# Patient Record
Sex: FEMALE | Race: BLACK OR AFRICAN AMERICAN | Employment: OTHER
[De-identification: names, ages, dates, MRNs, and addresses within clinical notes are randomized per-mention and may not be internally consistent; named-entity substitution may affect disease eponyms.]

---

## 2023-07-22 ENCOUNTER — APPOINTMENT (OUTPATIENT)
Facility: HOSPITAL | Age: 48
End: 2023-07-22
Payer: MEDICAID

## 2023-07-22 ENCOUNTER — HOSPITAL ENCOUNTER (EMERGENCY)
Facility: HOSPITAL | Age: 48
Discharge: HOME | End: 2023-07-22
Attending: EMERGENCY MEDICINE
Payer: MEDICAID

## 2023-07-22 VITALS
RESPIRATION RATE: 18 BRPM | SYSTOLIC BLOOD PRESSURE: 140 MMHG | OXYGEN SATURATION: 100 % | WEIGHT: 219 LBS | BODY MASS INDEX: 44.15 KG/M2 | TEMPERATURE: 98.1 F | DIASTOLIC BLOOD PRESSURE: 92 MMHG | HEART RATE: 98 BPM | HEIGHT: 59 IN

## 2023-07-22 DIAGNOSIS — M54.50 RIGHT-SIDED LOW BACK PAIN WITHOUT SCIATICA, UNSPECIFIED CHRONICITY: Primary | ICD-10-CM
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
## 2023-07-22 DIAGNOSIS — M51.36 DEGENERATIVE DISC DISEASE, LUMBAR: ICD-10-CM

## 2023-07-22 LAB — HCG UR QL: NEGATIVE

## 2023-07-22 PROCEDURE — 72100 X-RAY EXAM L-S SPINE 2/3 VWS: CPT

## 2023-07-22 PROCEDURE — 99283 EMERGENCY DEPT VISIT LOW MDM: CPT

## 2023-07-22 PROCEDURE — 81025 URINE PREGNANCY TEST: CPT

## 2023-07-22 RX ORDER — ONDANSETRON 4 MG/1
4 TABLET, ORALLY DISINTEGRATING ORAL EVERY 8 HOURS PRN
Status: DISCONTINUED | OUTPATIENT
Start: 2023-07-22 | End: 2023-07-22 | Stop reason: HOSPADM

## 2023-07-22 RX ORDER — LIDOCAINE 50 MG/G
1 PATCH TOPICAL DAILY
Qty: 10 PATCH | Refills: 0 | Status: SHIPPED | OUTPATIENT
Start: 2023-07-22 | End: 2023-08-01

## 2023-07-22 RX ORDER — HYDROCODONE BITARTRATE AND ACETAMINOPHEN 7.5; 325 MG/1; MG/1
1 TABLET ORAL
Status: DISCONTINUED | OUTPATIENT
Start: 2023-07-22 | End: 2023-07-22

## 2023-07-22 RX ORDER — METHOCARBAMOL 750 MG/1
750 TABLET, FILM COATED ORAL 3 TIMES DAILY
Qty: 20 TABLET | Refills: 0 | Status: SHIPPED | OUTPATIENT
Start: 2023-07-22 | End: 2023-07-29

## 2023-07-22 RX ORDER — ACETAMINOPHEN 325 MG/1
650 TABLET ORAL EVERY 4 HOURS PRN
Status: DISCONTINUED | OUTPATIENT
Start: 2023-07-22 | End: 2023-07-22 | Stop reason: HOSPADM

## 2023-07-22 RX ORDER — METHOCARBAMOL 750 MG/1
750 TABLET, FILM COATED ORAL
Status: DISCONTINUED | OUTPATIENT
Start: 2023-07-22 | End: 2023-07-22 | Stop reason: HOSPADM

## 2023-07-22 ASSESSMENT — PAIN DESCRIPTION - DESCRIPTORS: DESCRIPTORS: ACHING

## 2023-07-22 ASSESSMENT — ENCOUNTER SYMPTOMS
BACK PAIN: 1
VOICE CHANGE: 0
DIARRHEA: 0
ABDOMINAL PAIN: 0
TROUBLE SWALLOWING: 0
NAUSEA: 0
SHORTNESS OF BREATH: 0
COUGH: 0

## 2023-07-22 ASSESSMENT — PAIN DESCRIPTION - LOCATION: LOCATION: BACK

## 2023-07-22 ASSESSMENT — PAIN - FUNCTIONAL ASSESSMENT: PAIN_FUNCTIONAL_ASSESSMENT: 0-10

## 2023-07-22 ASSESSMENT — PAIN SCALES - GENERAL: PAINLEVEL_OUTOF10: 9

## 2023-07-22 NOTE — ED TRIAGE NOTES
Pt c/o bilateral lower back following fall x 4 days ago. States she has been taking flexeril and heat/cold without relief.

## 2023-07-22 NOTE — ED NOTES
Pt made aware need for specimen sample. Pt voices inability to urinate, at this time. NP made aware pt pt requesting Tylenol versus Norco. Order placed. 1145: Pt to imaging. Pt returned from imaging,refused medication. Voice frustration of \"cold Xray table\"    5337: Pt moved to tafoya bed, visitor at bedside.       Shy Roger LPN  52/96/80 3288

## 2023-07-22 NOTE — ED PROVIDER NOTES
drugs.  Prescription drug management. REASSESSMENT    Patient has been reexamined and denies any further complaints of pain or discomfort. Will prescribe short course of Lidoderm topical patches with Robaxin for pain control. Recommend close follow-up with orthopedic back specialist for further evaluation and possible physical therapy. 5:40 PM  Patient's results and plan of care have been reviewed with the patient and her daughter. Patient and/or family have verbally conveyed their understanding and agreement of the patient's signs, symptoms, diagnosis, treatment and prognosis and additionally agree to follow up as recommended or return to the Emergency Room should her condition change prior to follow-up. Discharge instructions have also been provided to the patient with some educational information regarding her diagnosis as well a list of reasons why she would want to return to the ER prior to her follow-up appointment should her condition change. CONSULTS:  None    PROCEDURES:  Unless otherwise noted below, none     Procedures      FINAL IMPRESSION      1. Right-sided low back pain without sciatica, unspecified chronicity    2. Degenerative disc disease, lumbar    3.  Fall, initial encounter          DISPOSITION/PLAN   DISPOSITION Decision To Discharge 07/22/2023 01:04:23 PM      PATIENT REFERRED TO:  Sylvia Dumont MD  [facility]  [address]  [address]  [phone]    In 3 days  As needed, If symptoms worsen      DISCHARGE MEDICATIONS:  Discharge Medication List as of 7/22/2023  1:12 PM        START taking these medications    Details   methocarbamol (ROBAXIN-750) 750 MG tablet Take 1 tablet by mouth 3 times daily for 7 days, Disp-20 tablet, R-0Normal      lidocaine (LIDODERM) 5 % Place 1 patch onto the skin daily for 10 days 12 hours on, 12 hours off., Disp-10 patch, R-0Normal               (Please note that portions of this note were completed with a voice recognition program.  Efforts were made to edit the dictations but occasionally words are mis-transcribed.)    ROB Lundberg NP (electronically signed)  Emergency Attending Physician / Physician Assistant / Nurse Practitioner             ROB Lundberg NP  07/22/23 0538

## 2023-07-24 ENCOUNTER — TELEPHONE (OUTPATIENT)
Age: 48
End: 2023-07-24

## 2023-07-24 NOTE — TELEPHONE ENCOUNTER
----- Message from [facility] sent at 6/7/2023  2:32 PM EDT -----  Subject: Appointment Request    Reason for Call: New Patient/New to Provider Appointment needed: New   Patient Request Appointment    QUESTIONS    Reason for appointment request? No appointments available during search     Additional Information for Provider? Patient called in to reschedule her   appointment that she had for today at 3:00pm 6/7. No appointments were   available.  Please contact patient to further assist.   ---------------------------------------------------------------------------  --------------  Ronal Christian YANDEL  [phone]; OK to leave message on voicemail  ---------------------------------------------------------------------------  --------------  SCRIPT ANSWERS  COVID Screen: Minta Bumpers

## 2023-07-27 ENCOUNTER — OFFICE VISIT (OUTPATIENT)
Age: 48
End: 2023-07-27

## 2023-07-27 DIAGNOSIS — E66.01 MORBID OBESITY (HCC): Primary | ICD-10-CM

## 2023-08-03 ENCOUNTER — HOSPITAL ENCOUNTER (EMERGENCY)
Facility: HOSPITAL | Age: 48
Discharge: HOME | End: 2023-08-03
Attending: EMERGENCY MEDICINE
Payer: MEDICAID

## 2023-08-03 VITALS
RESPIRATION RATE: 18 BRPM | TEMPERATURE: 98.4 F | WEIGHT: 215 LBS | SYSTOLIC BLOOD PRESSURE: 165 MMHG | HEIGHT: 59 IN | DIASTOLIC BLOOD PRESSURE: 65 MMHG | OXYGEN SATURATION: 100 % | HEART RATE: 90 BPM | BODY MASS INDEX: 43.34 KG/M2

## 2023-08-03 DIAGNOSIS — H10.9 CONJUNCTIVITIS OF RIGHT EYE, UNSPECIFIED CONJUNCTIVITIS TYPE: Primary | ICD-10-CM

## 2023-08-03 PROCEDURE — 99283 EMERGENCY DEPT VISIT LOW MDM: CPT

## 2023-08-03 RX ORDER — POLYMYXIN B SULFATE AND TRIMETHOPRIM 1; 10000 MG/ML; [USP'U]/ML
1 SOLUTION OPHTHALMIC EVERY 6 HOURS
Qty: 10 ML | Refills: 0 | Status: SHIPPED | OUTPATIENT
Start: 2023-08-03 | End: 2023-08-13

## 2023-08-03 ASSESSMENT — PAIN DESCRIPTION - ORIENTATION: ORIENTATION: RIGHT

## 2023-08-03 ASSESSMENT — PAIN SCALES - GENERAL: PAINLEVEL_OUTOF10: 8

## 2023-08-03 ASSESSMENT — PAIN - FUNCTIONAL ASSESSMENT: PAIN_FUNCTIONAL_ASSESSMENT: 0-10

## 2023-08-03 ASSESSMENT — PAIN DESCRIPTION - LOCATION: LOCATION: EYE

## 2023-08-03 NOTE — ED PROVIDER NOTES
[facility] EMERGENCY DEPT  EMERGENCY DEPARTMENT ENCOUNTER      Pt Name: Jagdish Lee  MRN: [id number]  [address] [facility] [place] 1975  Date of evaluation: 8/3/2023  Provider: Elizabeth Mondragon MD    [address]       Chief Complaint   Patient presents with    Eye Pain         HISTORY OF PRESENT ILLNESS   (Location/Symptom, Timing/Onset, Context/Setting, Quality, Duration, Modifying Factors, Severity)  Note limiting factors. 48-year-old who is status post bilateral corneal transplants 9 years ago. She presents with complaints that she awoke this morning with her right eyelashes stuck together. She noted some discharge. Her right eye feels irritated. She states that yesterday her eye doctor removed 2 sutures from her right eye that had been in place since her transplant surgery. No change in vision. No eye redness noted. No other complaints. Review of External Medical Records:     Nursing Notes were reviewed. REVIEW OF SYSTEMS    (2-9 systems for level 4, 10 or more for level 5)     Review of Systems    Except as noted above the remainder of the review of systems was reviewed and negative. PAST MEDICAL HISTORY     Past Medical History:   Diagnosis Date    Hx of cornea transplant     Left Eye         SURGICAL HISTORY       Past Surgical History:   Procedure Laterality Date    BREAST ENHANCEMENT SURGERY       SECTION      GASTRIC BYPASS SURGERY N/A     By Dr. Sofie Falcon       Previous Medications    ACYCLOVIR (ZOVIRAX) 400 MG TABLET    Take 1 tablet by mouth    CYCLOBENZAPRINE (FLEXERIL) 10 MG TABLET    TAKE 1 TABLET BY MOUTH EVERY DAY AT BEDTIME AS NEEDED FOR 90 DAYS    ERGOCALCIFEROL (ERGOCALCIFEROL) 1.25 MG (67891 UT) CAPSULE    Take 50,000 Units by mouth    HYDROCODONE-CHLORPHENIRAMINE (TUSSIONEX) 10-8 MG/5ML SUER    Take 5 mLs by mouth. ALLERGIES     Aspirin and Ibuprofen    FAMILY HISTORY     History reviewed.  No pertinent

## 2023-08-03 NOTE — ED TRIAGE NOTES
Pt presents to ER with c/o right eye pain s/p having her sutures removed yesterday in her right eye by Dr. Aria Amaro at [facility]. Pt reports h/o b/l corneal transplant in 2014 and she's had her right eye sutures in place since then. Sutures removed yesterday with c/o pain, irritation, discharge, and burning since this AM when she woke up. Denies fevers, denies blurred vision, no recent illness.

## 2023-08-03 NOTE — ED NOTES
Discharge instructions and paperwork was given to this patient with no further questions asked. Patient verbalized an understanding.  Patient left the ED in stable condition     1404 Dane Powell RN  08/03/23 5787

## 2023-08-10 ENCOUNTER — TELEPHONE (OUTPATIENT)
Age: 48
End: 2023-08-10

## 2023-08-10 NOTE — TELEPHONE ENCOUNTER
Patient left a voicemail requesting an appointment with Dr. Tanya Sevilla. Patient stated she has completed the UGI and is now ready to schedule. Please call patient.

## 2023-08-17 ENCOUNTER — TELEPHONE (OUTPATIENT)
Age: 48
End: 2023-08-17

## 2023-08-17 NOTE — TELEPHONE ENCOUNTER
SPOKE WITH PT ADVISED HER THAT SHE STILL NEEDED HER EGD BEFORE WE COULD SCHEDULE WITH DR. WALL -- PT STATED THAT SHE HAD IT DONE TWO WEEKS AGO -- I ADVISED THE PT THAT I WOULD REACH OUT TO DR. YEH'S OFFICE TO CONFIRM.

## 2023-08-21 ENCOUNTER — OFFICE VISIT (OUTPATIENT)
Age: 48
End: 2023-08-21

## 2023-08-21 DIAGNOSIS — E66.01 MORBID OBESITY (HCC): Primary | ICD-10-CM

## 2023-08-30 ENCOUNTER — OFFICE VISIT (OUTPATIENT)
Age: 48
End: 2023-08-30
Payer: MEDICAID

## 2023-08-30 VITALS
OXYGEN SATURATION: 97 % | BODY MASS INDEX: 45.16 KG/M2 | SYSTOLIC BLOOD PRESSURE: 130 MMHG | HEART RATE: 91 BPM | RESPIRATION RATE: 16 BRPM | WEIGHT: 224 LBS | TEMPERATURE: 98.7 F | DIASTOLIC BLOOD PRESSURE: 80 MMHG | HEIGHT: 59 IN

## 2023-08-30 DIAGNOSIS — R06.83 SNORING: ICD-10-CM

## 2023-08-30 DIAGNOSIS — E66.01 MORBID OBESITY (HCC): Primary | ICD-10-CM

## 2023-08-30 PROCEDURE — 99213 OFFICE O/P EST LOW 20 MIN: CPT | Performed by: SURGERY

## 2023-08-30 RX ORDER — ERGOCALCIFEROL 1.25 MG/1
50000 CAPSULE ORAL
Qty: 24 CAPSULE | Refills: 0 | Status: SHIPPED | OUTPATIENT
Start: 2023-08-31

## 2023-08-30 ASSESSMENT — PATIENT HEALTH QUESTIONNAIRE - PHQ9
SUM OF ALL RESPONSES TO PHQ QUESTIONS 1-9: 0
2. FEELING DOWN, DEPRESSED OR HOPELESS: 0
1. LITTLE INTEREST OR PLEASURE IN DOING THINGS: 0
SUM OF ALL RESPONSES TO PHQ9 QUESTIONS 1 & 2: 0

## 2023-09-01 ENCOUNTER — CLINICAL DOCUMENTATION (OUTPATIENT)
Age: 48
End: 2023-09-01

## 2023-09-11 ENCOUNTER — TELEPHONE (OUTPATIENT)
Age: 48
End: 2023-09-11

## 2023-09-11 NOTE — TELEPHONE ENCOUNTER
This writer attempted to call and remind pt of her appt on 9/12. All numbers in file for pt were out of service.

## 2023-09-12 ENCOUNTER — OFFICE VISIT (OUTPATIENT)
Age: 48
End: 2023-09-12
Payer: MEDICAID

## 2023-09-12 ENCOUNTER — TELEPHONE (OUTPATIENT)
Age: 48
End: 2023-09-12

## 2023-09-12 VITALS
RESPIRATION RATE: 18 BRPM | WEIGHT: 226.8 LBS | SYSTOLIC BLOOD PRESSURE: 142 MMHG | HEART RATE: 84 BPM | DIASTOLIC BLOOD PRESSURE: 86 MMHG | TEMPERATURE: 97.8 F | HEIGHT: 59 IN | BODY MASS INDEX: 45.72 KG/M2

## 2023-09-12 DIAGNOSIS — Z12.11 COLON CANCER SCREENING: ICD-10-CM

## 2023-09-12 DIAGNOSIS — N89.8 VAGINAL ITCHING: ICD-10-CM

## 2023-09-12 DIAGNOSIS — Z98.84 STATUS POST GASTRIC BYPASS FOR OBESITY: Primary | ICD-10-CM

## 2023-09-12 DIAGNOSIS — Z12.31 ENCOUNTER FOR SCREENING MAMMOGRAM FOR MALIGNANT NEOPLASM OF BREAST: ICD-10-CM

## 2023-09-12 DIAGNOSIS — E66.01 MORBID OBESITY (HCC): ICD-10-CM

## 2023-09-12 PROCEDURE — 99204 OFFICE O/P NEW MOD 45 MIN: CPT

## 2023-09-12 RX ORDER — PHENTERMINE HYDROCHLORIDE 15 MG/1
15 CAPSULE ORAL EVERY MORNING
Qty: 30 CAPSULE | Refills: 0 | Status: SHIPPED | OUTPATIENT
Start: 2023-09-12 | End: 2023-10-12

## 2023-09-12 SDOH — ECONOMIC STABILITY: HOUSING INSECURITY
IN THE LAST 12 MONTHS, WAS THERE A TIME WHEN YOU DID NOT HAVE A STEADY PLACE TO SLEEP OR SLEPT IN A SHELTER (INCLUDING NOW)?: NO

## 2023-09-12 SDOH — ECONOMIC STABILITY: FOOD INSECURITY: WITHIN THE PAST 12 MONTHS, THE FOOD YOU BOUGHT JUST DIDN'T LAST AND YOU DIDN'T HAVE MONEY TO GET MORE.: NEVER TRUE

## 2023-09-12 SDOH — ECONOMIC STABILITY: FOOD INSECURITY: WITHIN THE PAST 12 MONTHS, YOU WORRIED THAT YOUR FOOD WOULD RUN OUT BEFORE YOU GOT MONEY TO BUY MORE.: NEVER TRUE

## 2023-09-12 SDOH — ECONOMIC STABILITY: INCOME INSECURITY: HOW HARD IS IT FOR YOU TO PAY FOR THE VERY BASICS LIKE FOOD, HOUSING, MEDICAL CARE, AND HEATING?: NOT HARD AT ALL

## 2023-09-12 ASSESSMENT — PATIENT HEALTH QUESTIONNAIRE - PHQ9
SUM OF ALL RESPONSES TO PHQ9 QUESTIONS 1 & 2: 0
1. LITTLE INTEREST OR PLEASURE IN DOING THINGS: 0
SUM OF ALL RESPONSES TO PHQ QUESTIONS 1-9: 0
2. FEELING DOWN, DEPRESSED OR HOPELESS: 0

## 2023-09-12 NOTE — TELEPHONE ENCOUNTER
Patient called to update phone number to [phone]. Updated phone number.  Advised patient to call [phone] or [phone] to schedule sleep study

## 2023-09-12 NOTE — PROGRESS NOTES
Subjective     Chief Complaint   Patient presents with    New Patient      Nestor Reeves is a 51 y.o. female who presents for establishing care. Was seeing prior PCP but is establishing d/t insurance. History of LRYGBP (Dr. Eulene Schwab in 2005) with satisfactory weight loss (brittany 170 lbs) but subsequent weight regain in the following years. Checked in with bariatric surgery on 8/21/2023 seeking revisional bariatric surgery. Has been seeing nutritionist through bariatric team (Dr. Trev Dominguez) and has been working on nutrition and weight loss. Lost 8 pounds with one month of phentermine. Reports she was on highest dose. Tolerated well with no adverse symptoms    Dr. Erlinda Norwood. Optho. Corneal Transplants. L eye better than R.    Just got lab work with Dr. Trev Dominguez. Low vitamin D. Taking vitamin D supplements. Has gotten bariatric swallow and brandon en y has stretched out    Has two kids. Denies history of diabetes or HTN    MACK? Sleep study in the process of being scheduled. Has history of sleep apnea. Used CPAP, after bariatric surgery lost weight and didn't. Pap? Years since pap smear. Mammogram, Colonoscopy? Needs both    Past Medical History  Past Medical History:   Diagnosis Date    Asthma 02/2014    Hx of cornea transplant     Left Eye    Osteoarthritis 07/04/2023     Current Medications  Current Outpatient Medications   Medication Sig Dispense Refill    phentermine 15 MG capsule Take 1 capsule by mouth every morning for 30 days.  Max Daily Amount: 15 mg 30 capsule 0    Multiple Vitamins-Minerals (BARIATRIC MULTIVITAMINS/IRON PO) Take by mouth      vitamin D (ERGOCALCIFEROL) 1.25 MG (02167 UT) CAPS capsule Take 1 capsule by mouth Twice a Week 24 capsule 0    cyclobenzaprine (FLEXERIL) 10 MG tablet       acyclovir (ZOVIRAX) 400 MG tablet Take 1 tablet by mouth      Cyanocobalamin (VITAMIN B-12) 500 MCG LOZG Take by mouth (Patient not taking: Reported on 9/12/2023)      Calcium Citrate-Vitamin D (CALCIUM

## 2023-09-12 NOTE — PATIENT INSTRUCTIONS
Please use monistat over the counter as the box instructs for your vaginal itching. You can get this over the counter.

## 2023-09-18 ENCOUNTER — OFFICE VISIT (OUTPATIENT)
Age: 48
End: 2023-09-18

## 2023-09-18 DIAGNOSIS — E66.01 MORBID OBESITY (HCC): Primary | ICD-10-CM

## 2023-09-18 NOTE — PROGRESS NOTES
eating refined carbohydrate foods (bread, pasta, rice, potatoes) rarely. Pt is eating sweets/desserts occasionally. Pt is using healthy cooking methods. Pt is eating meals prepared outside of the home occasionally. Pt is drinking water/crystal lite-40-50 oz. Pt reports no emotional eating. Physical Activity/Exercise  We talked about the importance of increasing daily physical activity and beginning to develop an exercise regimen/routine. We talked about exercise as being an important part of long term weight loss after surgery. Comments:  During class, I discussed with patient the importance of getting into an exercise routine. Pt doing water aerobics 3x week for 1 1/2 hours or walking or going to gym doing Tabata classes Pt is encouraged to continue with current exercise    Behavior Modification       We talked about how to eat more mindfully. Tips and recommendations for how to make these changes were provided. Pt was encouraged to keep a food journal and record what they were taking in daily. Discussed drinking rule    Overall Assessment:  Nutrition evaluation reveals small lifestyle changes are being made. Goals set and recommendations made. Will continue to assess. Patient-Set Goals:   1. Nutrition - increase fluids to 64 oz per day  2. Exercise - continue with current exercise  3.  Behavior - no ice during drinking rule; take calcium citrate consistently; purchase bariatric MVI w/victorino Mercedes, RD  9/18/2023

## 2023-09-29 ENCOUNTER — OFFICE VISIT (OUTPATIENT)
Age: 48
End: 2023-09-29
Payer: MEDICAID

## 2023-09-29 VITALS
TEMPERATURE: 97.3 F | HEIGHT: 59 IN | SYSTOLIC BLOOD PRESSURE: 137 MMHG | BODY MASS INDEX: 44.58 KG/M2 | OXYGEN SATURATION: 96 % | RESPIRATION RATE: 18 BRPM | DIASTOLIC BLOOD PRESSURE: 82 MMHG | WEIGHT: 221.13 LBS | HEART RATE: 88 BPM

## 2023-09-29 DIAGNOSIS — R63.4 WEIGHT LOSS: ICD-10-CM

## 2023-09-29 DIAGNOSIS — Z98.84 STATUS POST GASTRIC BYPASS FOR OBESITY: ICD-10-CM

## 2023-09-29 DIAGNOSIS — B00.9 HERPES: ICD-10-CM

## 2023-09-29 DIAGNOSIS — M62.838 NECK MUSCLE SPASM: ICD-10-CM

## 2023-09-29 DIAGNOSIS — E66.01 MORBID OBESITY (HCC): Primary | ICD-10-CM

## 2023-09-29 PROCEDURE — 99214 OFFICE O/P EST MOD 30 MIN: CPT

## 2023-09-29 RX ORDER — PHENTERMINE HYDROCHLORIDE 30 MG/1
30 CAPSULE ORAL EVERY MORNING
Qty: 30 CAPSULE | Refills: 0 | Status: CANCELLED | OUTPATIENT
Start: 2023-09-29 | End: 2023-10-29

## 2023-09-29 RX ORDER — CYCLOBENZAPRINE HCL 10 MG
10 TABLET ORAL 3 TIMES DAILY PRN
Qty: 21 TABLET | Refills: 0 | Status: SHIPPED | OUTPATIENT
Start: 2023-09-29 | End: 2023-10-09

## 2023-09-29 RX ORDER — ACYCLOVIR 400 MG/1
800 TABLET ORAL 2 TIMES DAILY
Qty: 30 TABLET | Refills: 0 | Status: SHIPPED | OUTPATIENT
Start: 2023-09-29 | End: 2023-10-07

## 2023-09-29 RX ORDER — PHENTERMINE HYDROCHLORIDE 15 MG/1
15 CAPSULE ORAL EVERY MORNING
Qty: 30 CAPSULE | Refills: 0 | Status: SHIPPED | OUTPATIENT
Start: 2023-09-29 | End: 2023-10-29

## 2023-09-29 SDOH — ECONOMIC STABILITY: FOOD INSECURITY: WITHIN THE PAST 12 MONTHS, YOU WORRIED THAT YOUR FOOD WOULD RUN OUT BEFORE YOU GOT MONEY TO BUY MORE.: PATIENT DECLINED

## 2023-09-29 SDOH — ECONOMIC STABILITY: HOUSING INSECURITY
IN THE LAST 12 MONTHS, WAS THERE A TIME WHEN YOU DID NOT HAVE A STEADY PLACE TO SLEEP OR SLEPT IN A SHELTER (INCLUDING NOW)?: PATIENT REFUSED

## 2023-09-29 SDOH — ECONOMIC STABILITY: INCOME INSECURITY: HOW HARD IS IT FOR YOU TO PAY FOR THE VERY BASICS LIKE FOOD, HOUSING, MEDICAL CARE, AND HEATING?: PATIENT DECLINED

## 2023-09-29 SDOH — ECONOMIC STABILITY: FOOD INSECURITY: WITHIN THE PAST 12 MONTHS, THE FOOD YOU BOUGHT JUST DIDN'T LAST AND YOU DIDN'T HAVE MONEY TO GET MORE.: PATIENT DECLINED

## 2023-09-29 ASSESSMENT — PATIENT HEALTH QUESTIONNAIRE - PHQ9
1. LITTLE INTEREST OR PLEASURE IN DOING THINGS: 0
SUM OF ALL RESPONSES TO PHQ9 QUESTIONS 1 & 2: 0
SUM OF ALL RESPONSES TO PHQ QUESTIONS 1-9: 0
2. FEELING DOWN, DEPRESSED OR HOPELESS: 0
SUM OF ALL RESPONSES TO PHQ QUESTIONS 1-9: 0

## 2023-09-29 NOTE — PROGRESS NOTES
[facility] [place], [address] [facility] [address] [facility]   Office [phone], Fax [phone]   Subjective     Chief Complaint   Patient presents with    Weight Loss      Felipe Vieira is a 51 y.o. female who presents for weight loss follow up. Re-started on phentermine one month ago at 15mg daily dose. Has lost 5 pounds since then. Doing well with no adverse symptoms    Goal is to be below 200 pounds. 170 is another goal. 35 pounds total to lose, has lost 5 pounds. BP: 137/82. Taking BP at home: Takes every week, Mostly high 321'Y systolic. Lifestyle:  - Exercise: Tabata classes twice per week. Going to gym 3 times week. - Nutrition: Has appt Oct 16th. Trying to get her in for a sleep study. Working on getting good protein in. Reports she needs refill on acyclovir. Last flair up 3-6 months ago. Has been taking daily. No palpitations, headaches, vision changes. Past Medical History  Past Medical History:   Diagnosis Date    Asthma 02/2014    Hx of cornea transplant     Left Eye    Osteoarthritis 07/04/2023     Current Medications  Current Outpatient Medications   Medication Sig Dispense Refill    acyclovir (ZOVIRAX) 400 MG tablet Take 2 tablets by mouth in the morning and at bedtime for 8 days Take 800 mg (two pills) twice per day for 5 days with herpes flair up 30 tablet 0    cyclobenzaprine (FLEXERIL) 10 MG tablet Take 1 tablet by mouth 3 times daily as needed for Muscle spasms 21 tablet 0    phentermine 15 MG capsule Take 1 capsule by mouth every morning for 30 days.  Max Daily Amount: 15 mg 30 capsule 0    Cyanocobalamin (VITAMIN B-12) 500 MCG LOZG Take by mouth      Multiple Vitamins-Minerals (BARIATRIC MULTIVITAMINS/IRON PO) Take by mouth      vitamin D (ERGOCALCIFEROL) 1.25 MG (41866 UT) CAPS capsule Take 1 capsule by mouth Twice a Week 24 capsule 0    Calcium Citrate-Vitamin D (CALCIUM CITRATE + D PO) Take by mouth (Patient not taking: Reported on 9/12/2023)       No current

## 2023-09-30 LAB
CHOLEST SERPL-MCNC: 171 MG/DL (ref 100–199)
HBA1C MFR BLD: 6 % (ref 4.8–5.6)
HDLC SERPL-MCNC: 76 MG/DL
IMP & REVIEW OF LAB RESULTS: NORMAL
LDLC SERPL CALC-MCNC: 86 MG/DL (ref 0–99)
TRIGL SERPL-MCNC: 41 MG/DL (ref 0–149)
TSH SERPL DL<=0.005 MIU/L-ACNC: 0.61 UIU/ML (ref 0.45–4.5)
VLDLC SERPL CALC-MCNC: 9 MG/DL (ref 5–40)

## 2023-10-02 NOTE — PROGRESS NOTES
I reviewed with the resident the medical history and the resident's findings on the physical examination. I discussed with the resident the patient's diagnosis and concur with the plan.      Elroy Pereira MD 10/2/2023

## 2023-10-30 ENCOUNTER — OFFICE VISIT (OUTPATIENT)
Age: 48
End: 2023-10-30
Payer: MEDICAID

## 2023-10-30 VITALS
SYSTOLIC BLOOD PRESSURE: 133 MMHG | WEIGHT: 217.4 LBS | OXYGEN SATURATION: 96 % | HEART RATE: 100 BPM | DIASTOLIC BLOOD PRESSURE: 89 MMHG | RESPIRATION RATE: 18 BRPM | HEIGHT: 59 IN | TEMPERATURE: 97.5 F | BODY MASS INDEX: 43.83 KG/M2

## 2023-10-30 DIAGNOSIS — E66.01 MORBID OBESITY (HCC): Primary | ICD-10-CM

## 2023-10-30 DIAGNOSIS — R63.4 WEIGHT LOSS: ICD-10-CM

## 2023-10-30 PROCEDURE — 99213 OFFICE O/P EST LOW 20 MIN: CPT

## 2023-10-30 RX ORDER — PHENTERMINE HYDROCHLORIDE 15 MG/1
15 CAPSULE ORAL EVERY MORNING
COMMUNITY
End: 2023-10-30

## 2023-10-30 RX ORDER — PHENTERMINE HYDROCHLORIDE 30 MG/1
30 CAPSULE ORAL EVERY MORNING
Qty: 30 CAPSULE | Refills: 0 | Status: SHIPPED | OUTPATIENT
Start: 2023-10-30 | End: 2023-11-29

## 2023-10-30 SDOH — ECONOMIC STABILITY: FOOD INSECURITY: WITHIN THE PAST 12 MONTHS, YOU WORRIED THAT YOUR FOOD WOULD RUN OUT BEFORE YOU GOT MONEY TO BUY MORE.: PATIENT DECLINED

## 2023-10-30 SDOH — ECONOMIC STABILITY: FOOD INSECURITY: WITHIN THE PAST 12 MONTHS, THE FOOD YOU BOUGHT JUST DIDN'T LAST AND YOU DIDN'T HAVE MONEY TO GET MORE.: PATIENT DECLINED

## 2023-10-30 SDOH — ECONOMIC STABILITY: INCOME INSECURITY: HOW HARD IS IT FOR YOU TO PAY FOR THE VERY BASICS LIKE FOOD, HOUSING, MEDICAL CARE, AND HEATING?: PATIENT DECLINED

## 2023-10-30 ASSESSMENT — PATIENT HEALTH QUESTIONNAIRE - PHQ9
SUM OF ALL RESPONSES TO PHQ QUESTIONS 1-9: 0
2. FEELING DOWN, DEPRESSED OR HOPELESS: 0
SUM OF ALL RESPONSES TO PHQ QUESTIONS 1-9: 0
1. LITTLE INTEREST OR PLEASURE IN DOING THINGS: 0
SUM OF ALL RESPONSES TO PHQ9 QUESTIONS 1 & 2: 0

## 2023-10-31 ENCOUNTER — OFFICE VISIT (OUTPATIENT)
Age: 48
End: 2023-10-31

## 2023-10-31 DIAGNOSIS — E66.01 MORBID OBESITY (HCC): Primary | ICD-10-CM

## 2023-11-20 ENCOUNTER — OFFICE VISIT (OUTPATIENT)
Age: 48
End: 2023-11-20
Payer: MEDICAID

## 2023-11-20 VITALS
SYSTOLIC BLOOD PRESSURE: 116 MMHG | HEIGHT: 59 IN | DIASTOLIC BLOOD PRESSURE: 82 MMHG | BODY MASS INDEX: 45.08 KG/M2 | TEMPERATURE: 97.3 F | HEART RATE: 93 BPM | OXYGEN SATURATION: 99 % | RESPIRATION RATE: 18 BRPM | WEIGHT: 223.6 LBS

## 2023-11-20 DIAGNOSIS — E66.01 MORBID OBESITY (HCC): Primary | ICD-10-CM

## 2023-11-20 DIAGNOSIS — R63.4 WEIGHT LOSS: ICD-10-CM

## 2023-11-20 PROCEDURE — 99213 OFFICE O/P EST LOW 20 MIN: CPT

## 2023-11-20 RX ORDER — CYCLOBENZAPRINE HCL 10 MG
TABLET ORAL
COMMUNITY
Start: 2023-11-12

## 2023-11-20 RX ORDER — PHENTERMINE HYDROCHLORIDE 30 MG/1
30 CAPSULE ORAL EVERY MORNING
Qty: 30 CAPSULE | Refills: 0 | Status: SHIPPED | OUTPATIENT
Start: 2023-11-20 | End: 2023-12-20

## 2023-11-20 SDOH — ECONOMIC STABILITY: FOOD INSECURITY: WITHIN THE PAST 12 MONTHS, YOU WORRIED THAT YOUR FOOD WOULD RUN OUT BEFORE YOU GOT MONEY TO BUY MORE.: PATIENT DECLINED

## 2023-11-20 SDOH — ECONOMIC STABILITY: FOOD INSECURITY: WITHIN THE PAST 12 MONTHS, THE FOOD YOU BOUGHT JUST DIDN'T LAST AND YOU DIDN'T HAVE MONEY TO GET MORE.: PATIENT DECLINED

## 2023-11-20 SDOH — ECONOMIC STABILITY: INCOME INSECURITY: HOW HARD IS IT FOR YOU TO PAY FOR THE VERY BASICS LIKE FOOD, HOUSING, MEDICAL CARE, AND HEATING?: PATIENT DECLINED

## 2023-11-20 ASSESSMENT — PATIENT HEALTH QUESTIONNAIRE - PHQ9
SUM OF ALL RESPONSES TO PHQ QUESTIONS 1-9: 0
1. LITTLE INTEREST OR PLEASURE IN DOING THINGS: 0
SUM OF ALL RESPONSES TO PHQ9 QUESTIONS 1 & 2: 0
2. FEELING DOWN, DEPRESSED OR HOPELESS: 0

## 2023-11-29 ENCOUNTER — HOSPITAL ENCOUNTER (EMERGENCY)
Facility: HOSPITAL | Age: 48
Discharge: HOME | End: 2023-11-29
Attending: EMERGENCY MEDICINE
Payer: MEDICAID

## 2023-11-29 ENCOUNTER — APPOINTMENT (OUTPATIENT)
Facility: HOSPITAL | Age: 48
End: 2023-11-29
Payer: MEDICAID

## 2023-11-29 VITALS
SYSTOLIC BLOOD PRESSURE: 145 MMHG | BODY MASS INDEX: 43.75 KG/M2 | HEART RATE: 92 BPM | OXYGEN SATURATION: 100 % | WEIGHT: 217 LBS | RESPIRATION RATE: 16 BRPM | HEIGHT: 59 IN | TEMPERATURE: 99.2 F | DIASTOLIC BLOOD PRESSURE: 64 MMHG

## 2023-11-29 DIAGNOSIS — R07.89 CHEST WALL PAIN: ICD-10-CM
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
## 2023-11-29 DIAGNOSIS — M54.50 ACUTE BILATERAL LOW BACK PAIN, UNSPECIFIED WHETHER SCIATICA PRESENT: ICD-10-CM

## 2023-11-29 PROCEDURE — 6360000002 HC RX W HCPCS: Performed by: FAMILY MEDICINE

## 2023-11-29 PROCEDURE — 99284 EMERGENCY DEPT VISIT MOD MDM: CPT

## 2023-11-29 PROCEDURE — 6370000000 HC RX 637 (ALT 250 FOR IP): Performed by: FAMILY MEDICINE

## 2023-11-29 PROCEDURE — 96372 THER/PROPH/DIAG INJ SC/IM: CPT

## 2023-11-29 PROCEDURE — 71101 X-RAY EXAM UNILAT RIBS/CHEST: CPT

## 2023-11-29 RX ORDER — LIDOCAINE 50 MG/G
1 PATCH TOPICAL DAILY
Qty: 10 PATCH | Refills: 0 | Status: SHIPPED | OUTPATIENT
Start: 2023-11-29 | End: 2023-12-09

## 2023-11-29 RX ORDER — CYCLOBENZAPRINE HCL 10 MG
10 TABLET ORAL
Status: COMPLETED | OUTPATIENT
Start: 2023-11-29 | End: 2023-11-29

## 2023-11-29 RX ORDER — CYCLOBENZAPRINE HCL 10 MG
10 TABLET ORAL 3 TIMES DAILY PRN
Qty: 21 TABLET | Refills: 0 | Status: SHIPPED | OUTPATIENT
Start: 2023-11-29 | End: 2023-12-09

## 2023-11-29 RX ORDER — KETOROLAC TROMETHAMINE 30 MG/ML
30 INJECTION, SOLUTION INTRAMUSCULAR; INTRAVENOUS
Status: COMPLETED | OUTPATIENT
Start: 2023-11-29 | End: 2023-11-29

## 2023-11-29 RX ORDER — KETOROLAC TROMETHAMINE 10 MG/1
10 TABLET, FILM COATED ORAL EVERY 6 HOURS PRN
Qty: 20 TABLET | Refills: 0 | Status: SHIPPED | OUTPATIENT
Start: 2023-11-29

## 2023-11-29 RX ORDER — LIDOCAINE 4 G/G
1 PATCH TOPICAL
Status: DISCONTINUED | OUTPATIENT
Start: 2023-11-29 | End: 2023-11-29 | Stop reason: HOSPADM

## 2023-11-29 RX ADMIN — KETOROLAC TROMETHAMINE 30 MG: 30 INJECTION, SOLUTION INTRAMUSCULAR at 18:45

## 2023-11-29 RX ADMIN — CYCLOBENZAPRINE 10 MG: 10 TABLET, FILM COATED ORAL at 18:45

## 2023-11-29 ASSESSMENT — ENCOUNTER SYMPTOMS
VOMITING: 0
BACK PAIN: 1
NAUSEA: 0
COUGH: 0
SHORTNESS OF BREATH: 0
ABDOMINAL PAIN: 0

## 2023-11-29 ASSESSMENT — PAIN DESCRIPTION - LOCATION
LOCATION: GENERALIZED
LOCATION: GENERALIZED

## 2023-11-29 ASSESSMENT — PAIN - FUNCTIONAL ASSESSMENT
PAIN_FUNCTIONAL_ASSESSMENT: 0-10
PAIN_FUNCTIONAL_ASSESSMENT: ACTIVITIES ARE NOT PREVENTED

## 2023-11-29 ASSESSMENT — PAIN SCALES - GENERAL
PAINLEVEL_OUTOF10: 8
PAINLEVEL_OUTOF10: 7

## 2023-11-29 ASSESSMENT — PAIN DESCRIPTION - DESCRIPTORS: DESCRIPTORS: ACHING

## 2023-11-29 ASSESSMENT — PAIN DESCRIPTION - PAIN TYPE: TYPE: ACUTE PAIN
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
## 2023-11-30 RX ORDER — ERGOCALCIFEROL 1.25 MG/1
CAPSULE ORAL
Qty: 8 CAPSULE | Refills: 2 | OUTPATIENT
Start: 2023-11-30
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
## 2024-02-04 ASSESSMENT — SLEEP AND FATIGUE QUESTIONNAIRES
FOSQ SCORE: 19.5
HOW LIKELY ARE YOU TO NOD OFF OR FALL ASLEEP WHILE LYING DOWN TO REST IN THE AFTERNOON WHEN CIRCUMSTANCES PERMIT: MODERATE CHANCE OF DOZING
AVERAGE NUMBER OF SLEEP HOURS: 5
DO YOU TAKE NAPS: NO
DO YOU HAVE DIFFICULTY BEING AS ACTIVE AS YOU WANT TO BE IN THE MORNING BECAUSE YOU ARE SLEEPY OR TIRED: NO
HAS YOUR MOOD BEEN AFFECTED BECAUSE YOU ARE SLEEPY OR TIRED: NO
DO YOU HAVE DIFFICULTY OPERATING A MOTOR VEHICLE FOR SHORT DISTANCES (LESS THAN 100 MILES) BECAUSE YOU BECOME SLEEPY: NO
NUMBER OF TIMES YOU WAKE PER NIGHT: 3
HOW LIKELY ARE YOU TO NOD OFF OR FALL ASLEEP IN A CAR, WHILE STOPPED FOR A FEW MINUTES IN TRAFFIC: WOULD NEVER DOZE
SELECT ANY OF THE FOLLOWING BEHAVIORS OBSERVED WHILE YOU ARE ASLEEP: PAUSES IN BREATHING
HOW LIKELY ARE YOU TO NOD OFF OR FALL ASLEEP WHILE SITTING QUIETLY AFTER LUNCH WITHOUT ALCOHOL: 0
SELECT ANY OF THE FOLLOWING BEHAVIORS OBSERVED WHILE YOU ARE ASLEEP: KICKING WITH LEGS
HOW LIKELY ARE YOU TO NOD OFF OR FALL ASLEEP WHILE SITTING QUIETLY AFTER LUNCH WITHOUT ALCOHOL: WOULD NEVER DOZE
DO YOU GENERALLY HAVE DIFFICULTY REMEMBERING THINGS BECAUSE YOU ARE SLEEPY OR TIRED: NO
DO YOU GET TOO LITTLE SLEEP AT NIGHT: YES
DO YOU HAVE DIFFICULTY CONCENTRATING ON THE THINGS YOU DO BECAUSE YOU ARE SLEEPY OR TIRED: NO
HOW LIKELY ARE YOU TO NOD OFF OR FALL ASLEEP WHILE WATCHING TV: 2
HOW LIKELY ARE YOU TO NOD OFF OR FALL ASLEEP WHILE SITTING INACTIVE IN A PUBLIC PLACE: 0
HOW LIKELY ARE YOU TO NOD OFF OR FALL ASLEEP WHILE SITTING AND READING: 1
HOW LIKELY ARE YOU TO NOD OFF OR FALL ASLEEP IN A CAR, WHILE STOPPED FOR A FEW MINUTES IN TRAFFIC: 0
ARE YOU BOTHERED BY WAKING UP TOO EARLY AND NOT BEING ABLE TO GET BACK TO SLEEP: IS
DO YOU HAVE PROBLEMS WITH FREQUENT AWAKENINGS AT NIGHT: YES
HOW LIKELY ARE YOU TO NOD OFF OR FALL ASLEEP WHILE SITTING INACTIVE IN A PUBLIC PLACE: WOULD NEVER DOZE
DO YOU GET TOO LITTLE SLEEP AT NIGHT: DOES
HAS YOUR RELATIONSHIP WITH FAMILY, FRIENDS OR WORK COLLEAGUES BEEN AFFECTED BECAUSE YOU ARE SLEEPY OR TIRED: NO
SELECT ANY OF THE FOLLOWING BEHAVIORS OBSERVED WHILE YOU ARE ASLEEP: LIGHT SNORING
HOW LIKELY ARE YOU TO NOD OFF OR FALL ASLEEP WHILE SITTING AND READING: SLIGHT CHANCE OF DOZING
ESS TOTAL SCORE: 6
ARE YOU BOTHERED BY WAKING UP TOO EARLY AND NOT BEING ABLE TO GET BACK TO SLEEP: YES
AVERAGE NUMBER OF SLEEP HOURS: 5
DO YOU HAVE DIFFICULTY BEING AS ACTIVE AS YOU WANT TO BE IN THE EVENING BECAUSE YOU ARE SLEEPY OR TIRED: NO
DO YOU HAVE DIFFICULTY VISITING YOUR FAMILY OR FRIENDS IN THEIR HOME BECAUSE YOU BECOME SLEEPY OR TIRED: NO
HOW LIKELY ARE YOU TO NOD OFF OR FALL ASLEEP WHILE LYING DOWN TO REST IN THE AFTERNOON WHEN CIRCUMSTANCES PERMIT: 2
DO YOU HAVE DIFFICULTY OPERATING A MOTOR VEHICLE FOR LONG DISTANCES (GREATER THAN 100 MILES) BECAUSE YOU BECOME SLEEPY: YES, A LITTLE
SELECT ANY OF THE FOLLOWING BEHAVIORS OBSERVED WHILE YOU ARE ASLEEP: LOUD SNORING
DO YOU WORK SHIFTS: NO
HOW LIKELY ARE YOU TO NOD OFF OR FALL ASLEEP WHEN YOU ARE A PASSENGER IN A CAR FOR AN HOUR WITHOUT A BREAK: 1
DO YOU HAVE DIFFICULTY WATCHING A MOVIE OR VIDEO BECAUSE YOU BECOME SLEEPY OR TIRED: NO
WHAT TIME DO YOU USUALLY WAKE UP: 04:45
HOW LIKELY ARE YOU TO NOD OFF OR FALL ASLEEP WHILE SITTING AND TALKING TO SOMEONE: WOULD NEVER DOZE
HOW LIKELY ARE YOU TO NOD OFF OR FALL ASLEEP WHILE SITTING AND TALKING TO SOMEONE: 0
HOW LIKELY ARE YOU TO NOD OFF OR FALL ASLEEP WHEN YOU ARE A PASSENGER IN A CAR FOR AN HOUR WITHOUT A BREAK: SLIGHT CHANCE OF DOZING
HOW LIKELY ARE YOU TO NOD OFF OR FALL ASLEEP WHILE WATCHING TV: MODERATE CHANCE OF DOZING

## 2024-02-07 ENCOUNTER — OFFICE VISIT (OUTPATIENT)
Age: 49
End: 2024-02-07
Payer: MEDICAID

## 2024-02-07 VITALS
BODY MASS INDEX: 45.88 KG/M2 | DIASTOLIC BLOOD PRESSURE: 88 MMHG | OXYGEN SATURATION: 98 % | SYSTOLIC BLOOD PRESSURE: 147 MMHG | HEIGHT: 59 IN | WEIGHT: 227.6 LBS | HEART RATE: 97 BPM

## 2024-02-07 DIAGNOSIS — G47.33 OSA (OBSTRUCTIVE SLEEP APNEA): Primary | ICD-10-CM

## 2024-02-07 DIAGNOSIS — E66.01 MORBID OBESITY WITH BMI OF 45.0-49.9, ADULT (HCC): ICD-10-CM

## 2024-02-07 PROCEDURE — 99203 OFFICE O/P NEW LOW 30 MIN: CPT | Performed by: SPECIALIST

## 2024-02-07 NOTE — PROGRESS NOTES
[facility] [address]  [facility] SLEEP DISORDERS CENTER [facility]  [address] [facility] [address] [address]  Dept: [phone]           [address]  [place] [address]  Tel.  [phone]  Fax. [phone] [address]  [place] [address]  Tel.  [phone]  Fax. [phone] [address] [place] [address]  Tel.  [phone]  Fax. [phone]       Chief Complaint       Chief Complaint   Patient presents with    Sleep Problem     NP ref'd by Yassine Madden for snoring and obesiity       HPI      Gitaadiel Meehan is 48 y.o. female seen for evaluation of a sleep disorder.  She notes having had a previous evaluation of sleep apnea.  She underwent gastric bypass surgery in 2005 associated with weight loss.    She notes that she was reevaluated following that weight loss and was told that she no longer required CPAP.  In the interim she has gained at least 50 pounds from her weight loss brittany.    Currently retires to be 11 PM-midnight, gets out of bed at 6 AM.  May awaken 3-4 times during the night.  Has been told of some snoring.    Does not report significant nocturnal awakening or excessive daytime sleepiness.    [place] Sleepiness Scale: 6        2/7/2024     3:00 PM 2/4/2024    10:05 AM   Sleep Medicine   Sitting and reading  1   Watching TV  2   Sitting, inactive in a public place (e.g. a theatre or a meeting)  0   As a passenger in a car for an hour without a break  1   Lying down to rest in the afternoon when circumstances permit  2   Sitting and talking to someone  0   Sitting quietly after a lunch without alcohol  0   In a car, while stopped for a few minutes in traffic  0   [place] Sleepiness Score  6   Neck circumference (Inches) 17         Allergies   Allergen Reactions    Aspirin Nausea Only         Current Outpatient Medications:     ketorolac (TORADOL) 10 MG tablet, Take 1 tablet by mouth every 6 hours as needed for Pain,

## 2024-02-20 ENCOUNTER — PROCEDURE VISIT (OUTPATIENT)
Age: 49
End: 2024-02-20

## 2024-02-20 DIAGNOSIS — G47.33 OSA (OBSTRUCTIVE SLEEP APNEA): Primary | ICD-10-CM

## 2024-02-20 NOTE — PROGRESS NOTES
[address]  [place] [address]  Tel.  [phone]  Fax. [phone] [address]  [place] [address]  Tel.  [phone]  Fax. [phone] [address] [place] [address]  Tel.  [phone]  Fax. [phone]       S>Anna Meehan is a 48 y.o. female seen today to receive a home sleep testing unit (HST).    Patient was educated on proper hookup and operation of the HST.  Instruction forms and documentation were reviewed and signed.  The patient demonstrated good understanding of the HST.    O>    There were no vitals taken for this visit.      A>  No diagnosis found.      P>  General information regarding operations and maintenance of the device was provided.  She was provided information on sleep apnea including coresponding risk factors and the importance of proper treatment.   Follow-up appointment was made to return the HST. She will be contacted once the results have been reviewed.  She was asked to contact our office for any problems regarding her home sleep test study.

## 2024-03-07 ENCOUNTER — APPOINTMENT (OUTPATIENT)
Facility: HOSPITAL | Age: 49
End: 2024-03-07
Payer: MEDICAID

## 2024-03-07 ENCOUNTER — HOSPITAL ENCOUNTER (EMERGENCY)
Facility: HOSPITAL | Age: 49
Discharge: HOME | End: 2024-03-07
Attending: EMERGENCY MEDICINE | Admitting: EMERGENCY MEDICINE
Payer: MEDICAID

## 2024-03-07 VITALS
BODY MASS INDEX: 44.35 KG/M2 | DIASTOLIC BLOOD PRESSURE: 99 MMHG | SYSTOLIC BLOOD PRESSURE: 153 MMHG | WEIGHT: 220 LBS | TEMPERATURE: 98.4 F | OXYGEN SATURATION: 98 % | HEIGHT: 59 IN | HEART RATE: 97 BPM | RESPIRATION RATE: 16 BRPM

## 2024-03-07 DIAGNOSIS — M25.462 EFFUSION OF LEFT KNEE: ICD-10-CM

## 2024-03-07 DIAGNOSIS — S82.142A CLOSED FRACTURE OF LEFT TIBIAL PLATEAU, INITIAL ENCOUNTER: Primary | ICD-10-CM

## 2024-03-07 PROCEDURE — 6360000002 HC RX W HCPCS

## 2024-03-07 PROCEDURE — 99284 EMERGENCY DEPT VISIT MOD MDM: CPT

## 2024-03-07 PROCEDURE — 96372 THER/PROPH/DIAG INJ SC/IM: CPT

## 2024-03-07 PROCEDURE — 6370000000 HC RX 637 (ALT 250 FOR IP)

## 2024-03-07 PROCEDURE — 73562 X-RAY EXAM OF KNEE 3: CPT

## 2024-03-07 RX ORDER — KETOROLAC TROMETHAMINE 30 MG/ML
30 INJECTION, SOLUTION INTRAMUSCULAR; INTRAVENOUS
Status: COMPLETED | OUTPATIENT
Start: 2024-03-07 | End: 2024-03-07

## 2024-03-07 RX ORDER — ACETAMINOPHEN 500 MG
1000 TABLET ORAL
Qty: 60 TABLET | Refills: 0 | Status: SHIPPED | OUTPATIENT
Start: 2024-03-07 | End: 2024-04-06

## 2024-03-07 RX ORDER — LIDOCAINE 4 G/G
1 PATCH TOPICAL
Status: DISCONTINUED | OUTPATIENT
Start: 2024-03-07 | End: 2024-03-07 | Stop reason: HOSPADM

## 2024-03-07 RX ORDER — NAPROXEN 500 MG/1
500 TABLET ORAL 2 TIMES DAILY PRN
Qty: 20 TABLET | Refills: 0 | Status: SHIPPED | OUTPATIENT
Start: 2024-03-07 | End: 2024-03-17

## 2024-03-07 RX ORDER — LIDOCAINE 4 G/G
1 PATCH TOPICAL DAILY
Qty: 10 PATCH | Refills: 0 | Status: SHIPPED | OUTPATIENT
Start: 2024-03-07 | End: 2024-04-06

## 2024-03-07 RX ADMIN — KETOROLAC TROMETHAMINE 30 MG: 30 INJECTION, SOLUTION INTRAMUSCULAR; INTRAVENOUS at 13:26

## 2024-03-07 ASSESSMENT — PAIN - FUNCTIONAL ASSESSMENT: PAIN_FUNCTIONAL_ASSESSMENT: 0-10

## 2024-03-07 ASSESSMENT — PAIN SCALES - GENERAL
PAINLEVEL_OUTOF10: 8
PAINLEVEL_OUTOF10: 8

## 2024-03-07 ASSESSMENT — PAIN DESCRIPTION - ORIENTATION
ORIENTATION: LEFT
ORIENTATION: LEFT

## 2024-03-07 ASSESSMENT — PAIN DESCRIPTION - LOCATION
LOCATION: KNEE
LOCATION: KNEE

## 2024-03-07 ASSESSMENT — PAIN DESCRIPTION - DESCRIPTORS
DESCRIPTORS: ACHING
DESCRIPTORS: ACHING

## 2024-03-07 NOTE — ED PROVIDER NOTES
[facility] EMERGENCY DEPT  EMERGENCY DEPARTMENT ENCOUNTER      Pt Name: Anna Meehan  MRN: [id number]  Birthdate 1975  Date of evaluation: 3/7/2024  Provider: Brandee Conklin PA-C    CHIEF COMPLAINT       Chief Complaint   Patient presents with    Knee Injury         HISTORY OF PRESENT ILLNESS   (Location/Symptom, Timing/Onset, Context/Setting, Quality, Duration, Modifying Factors, Severity)  Note limiting factors.   The history is provided by the patient.       Anna Meehan is a 48 y.o. female with Hx of asthma, corneal transplant, gastric bypass who presents ambulatory to [place] ED with cc of left knee injury.  Patient tripped getting out of her car yesterday morning and fell on her left knee.  Ice, heat, Flexeril tried prior to arrival.  No head injury, LOC, blood thinners, pain or injury elsewhere, any other concerns at this time.      PCP: Demar Zaidi DO    There are no other complaints, changes or physical findings at this time.    Review of External Medical Records:     Nursing Notes were reviewed.    REVIEW OF SYSTEMS    (2-9 systems for level 4, 10 or more for level 5)     Review of Systems   All other systems reviewed and are negative.      Except as noted above the remainder of the review of systems was reviewed and negative.       PAST MEDICAL HISTORY     Past Medical History:   Diagnosis Date    Asthma 2014    Hx of cornea transplant     Left Eye    Osteoarthritis 2023         SURGICAL HISTORY       Past Surgical History:   Procedure Laterality Date    BREAST ENHANCEMENT SURGERY       SECTION      GASTRIC BYPASS SURGERY N/A     By Dr. Sukumar Pendleton    LAMELLAR KERATOPLASTY           CURRENT MEDICATIONS       Previous Medications    CALCIUM CITRATE-VITAMIN D (CALCIUM CITRATE + D PO)    Take by mouth    CYANOCOBALAMIN (VITAMIN B-12) 500 MCG LOZG    Take by mouth    CYCLOBENZAPRINE (FLEXERIL) 10 MG TABLET    TAKE 1 TABLET BY MOUTH EVERY DAY AT BEDTIME AS

## 2024-03-07 NOTE — ED NOTES
Pt discharged via wheelchair with crutches in hand in stable condition with no further questions at this time.

## 2024-03-07 NOTE — ED TRIAGE NOTES
Pt reports she tripped while getting out of her car at work yesterday morning and fell onto her L knee. Pt reports she has been using intermittent ice and heat without improvement. Pt reports she took at flexeril at 0645 this morning.

## 2024-03-08 RX ORDER — ERGOCALCIFEROL 1.25 MG/1
CAPSULE ORAL
Qty: 8 CAPSULE | Refills: 2 | OUTPATIENT
Start: 2024-03-08

## 2024-04-04 ENCOUNTER — TELEPHONE (OUTPATIENT)
Age: 49
End: 2024-04-04

## 2024-04-04 DIAGNOSIS — G47.33 OSA (OBSTRUCTIVE SLEEP APNEA): Primary | ICD-10-CM

## 2024-04-12 ENCOUNTER — TELEPHONE (OUTPATIENT)
Age: 49
End: 2024-04-12

## 2024-04-12 RX ORDER — ERGOCALCIFEROL 1.25 MG/1
CAPSULE ORAL
Qty: 8 CAPSULE | Refills: 2 | OUTPATIENT
Start: 2024-04-12

## 2024-04-12 NOTE — TELEPHONE ENCOUNTER
Patient called requesting a refill on Acyclovir 400 mg. Would like for prescription to be sent to [facility] on [address] Patient is scheduled for an appointment on 4/26.    Thanks!

## 2024-04-13 DIAGNOSIS — B00.9 HERPES: ICD-10-CM

## 2024-04-13 RX ORDER — ACYCLOVIR 400 MG/1
800 TABLET ORAL 2 TIMES DAILY
Qty: 30 TABLET | Refills: 0 | Status: SHIPPED | OUTPATIENT
Start: 2024-04-13 | End: 2024-04-21

## 2024-04-26 ENCOUNTER — OFFICE VISIT (OUTPATIENT)
Age: 49
End: 2024-04-26
Payer: COMMERCIAL

## 2024-04-26 VITALS
WEIGHT: 225.6 LBS | OXYGEN SATURATION: 99 % | BODY MASS INDEX: 45.48 KG/M2 | SYSTOLIC BLOOD PRESSURE: 137 MMHG | TEMPERATURE: 99 F | HEART RATE: 96 BPM | RESPIRATION RATE: 15 BRPM | HEIGHT: 59 IN | DIASTOLIC BLOOD PRESSURE: 85 MMHG

## 2024-04-26 DIAGNOSIS — E66.01 MORBID OBESITY (HCC): Primary | ICD-10-CM

## 2024-04-26 DIAGNOSIS — R63.4 WEIGHT LOSS: ICD-10-CM

## 2024-04-26 PROCEDURE — 99214 OFFICE O/P EST MOD 30 MIN: CPT

## 2024-04-26 RX ORDER — PHENTERMINE HYDROCHLORIDE 30 MG/1
30 CAPSULE ORAL EVERY MORNING
Qty: 30 CAPSULE | Refills: 0 | Status: SHIPPED | OUTPATIENT
Start: 2024-04-26 | End: 2024-05-26

## 2024-04-26 ASSESSMENT — PATIENT HEALTH QUESTIONNAIRE - PHQ9
2. FEELING DOWN, DEPRESSED OR HOPELESS: NOT AT ALL
SUM OF ALL RESPONSES TO PHQ9 QUESTIONS 1 & 2: 0
SUM OF ALL RESPONSES TO PHQ QUESTIONS 1-9: 0
SUM OF ALL RESPONSES TO PHQ QUESTIONS 1-9: 0
1. LITTLE INTEREST OR PLEASURE IN DOING THINGS: NOT AT ALL
SUM OF ALL RESPONSES TO PHQ QUESTIONS 1-9: 0
SUM OF ALL RESPONSES TO PHQ QUESTIONS 1-9: 0

## 2024-04-26 NOTE — PROGRESS NOTES
Anna Meehan is a 48 y.o. female    Chief Complaint   Patient presents with    Weight Loss    Medication Refill       \"Have you been to the ER, urgent care clinic since your last visit?  Hospitalized since your last visit?\"    Yes, [facility]    “Have you seen or consulted any other health care providers outside of [facility] since your last visit?”    NO    “Have you had a colorectal cancer screening such as a colonoscopy/FIT/Cologuard?    NO    No colonoscopy on file  No cologuard on file  No FIT/FOBT on file   No flexible sigmoidoscopy on file        Have you had a mammogram?”   NO    No breast cancer screening on file      “Have you had a pap smear?”    NO    No cervical cancer screening on file            There were no vitals filed for this visit.       No data to display              Health Maintenance Due   Topic Date Due    Hepatitis B vaccine (1 of 3 - 3-dose series) Never done    COVID-19 Vaccine (1) Never done    HIV screen  Never done    Hepatitis C screen  Never done    DTaP/Tdap/Td vaccine (1 - Tdap) Never done    Cervical cancer screen  Never done    Breast cancer screen  Never done    Colorectal Cancer Screen  Never done       
I saw and evaluated the patient, performing the key elements of the service.  I discussed the findings, assessment and plan with the resident and agree with the resident's findings and plan as documented in the resident's note.    
Regular rate/rhythm, no murmurs  Pulm: Clear b/l, no wheezing, no crackles, no ronchi    Labs  Hemoglobin A1C   Date Value Ref Range Status   09/29/2023 6.0 (H) 4.8 - 5.6 % Final     Comment:              Prediabetes: 5.7 - 6.4           Diabetes: >6.4           Glycemic control for adults with diabetes: <7.0          Assessment and Plan   Anna Meehan is a 48 y.o. who is here for weight loss. Has been on phentermine holiday for the past 4 months. Discussed re-starting phentermine vs glp-1. Will go ahead and re-start phentermine at 30mg dose, anticipate going up to 37.5mg at one month follow up. At that time, will discuss GLP-1 further. Patient has been seeing bariatric team over the past 6 months, been working with a nutritionist, and has been on a supervised weight loss plan.    1. Morbid obesity (HCC)  -     phentermine 30 MG capsule; Take 1 capsule by mouth every morning for 30 days. Max Daily Amount: 30 mg, Disp-30 capsule, R-0Normal  2. Weight loss  -     phentermine 30 MG capsule; Take 1 capsule by mouth every morning for 30 days. Max Daily Amount: 30 mg, Disp-30 capsule, R-0Normal     I discussed and saw this patient with Dr. Brooks (Attending Physician)     Signed By:  Demar Zaidi DO  Family Medicine Resident

## 2024-08-05 ENCOUNTER — OFFICE VISIT (OUTPATIENT)
Age: 49
End: 2024-08-05
Payer: COMMERCIAL

## 2024-08-05 VITALS
TEMPERATURE: 99 F | HEIGHT: 59 IN | SYSTOLIC BLOOD PRESSURE: 138 MMHG | DIASTOLIC BLOOD PRESSURE: 87 MMHG | WEIGHT: 230 LBS | RESPIRATION RATE: 18 BRPM | OXYGEN SATURATION: 97 % | BODY MASS INDEX: 46.37 KG/M2 | HEART RATE: 98 BPM

## 2024-08-05 DIAGNOSIS — K21.9 GASTROESOPHAGEAL REFLUX DISEASE WITHOUT ESOPHAGITIS: ICD-10-CM

## 2024-08-05 DIAGNOSIS — E66.01 MORBID OBESITY (HCC): Primary | ICD-10-CM

## 2024-08-05 DIAGNOSIS — E66.01 MORBID OBESITY (HCC): ICD-10-CM

## 2024-08-05 PROCEDURE — 99213 OFFICE O/P EST LOW 20 MIN: CPT | Performed by: SURGERY

## 2024-08-06 LAB
25(OH)D3+25(OH)D2 SERPL-MCNC: 21.7 NG/ML (ref 30–100)
ALBUMIN SERPL-MCNC: 4.1 G/DL (ref 3.9–4.9)
ALP SERPL-CCNC: 121 IU/L (ref 44–121)
ALT SERPL-CCNC: 20 IU/L (ref 0–32)
AST SERPL-CCNC: 27 IU/L (ref 0–40)
BILIRUB SERPL-MCNC: 0.2 MG/DL (ref 0–1.2)
BUN SERPL-MCNC: 16 MG/DL (ref 6–24)
BUN/CREAT SERPL: 21 (ref 9–23)
CALCIUM SERPL-MCNC: 9.8 MG/DL (ref 8.7–10.2)
CHLORIDE SERPL-SCNC: 104 MMOL/L (ref 96–106)
CO2 SERPL-SCNC: 21 MMOL/L (ref 20–29)
CREAT SERPL-MCNC: 0.75 MG/DL (ref 0.57–1)
EGFRCR SERPLBLD CKD-EPI 2021: 98 ML/MIN/1.73
ERYTHROCYTE [DISTWIDTH] IN BLOOD BY AUTOMATED COUNT: 16.8 % (ref 11.7–15.4)
FOLATE SERPL-MCNC: 10.7 NG/ML
GLOBULIN SER CALC-MCNC: 3.2 G/DL (ref 1.5–4.5)
GLUCOSE SERPL-MCNC: 89 MG/DL (ref 70–99)
HBA1C MFR BLD: 5.8 % (ref 4.8–5.6)
HCT VFR BLD AUTO: 34.3 % (ref 34–46.6)
HGB BLD-MCNC: 10.5 G/DL (ref 11.1–15.9)
IRON SATN MFR SERPL: 13 % (ref 15–55)
IRON SERPL-MCNC: 60 UG/DL (ref 27–159)
MCH RBC QN AUTO: 21.1 PG (ref 26.6–33)
MCHC RBC AUTO-ENTMCNC: 30.6 G/DL (ref 31.5–35.7)
MCV RBC AUTO: 69 FL (ref 79–97)
PLATELET # BLD AUTO: 482 X10E3/UL (ref 150–450)
POTASSIUM SERPL-SCNC: 4.6 MMOL/L (ref 3.5–5.2)
PROT SERPL-MCNC: 7.3 G/DL (ref 6–8.5)
RBC # BLD AUTO: 4.98 X10E6/UL (ref 3.77–5.28)
SODIUM SERPL-SCNC: 141 MMOL/L (ref 134–144)
TIBC SERPL-MCNC: 449 UG/DL (ref 250–450)
TSH SERPL DL<=0.005 MIU/L-ACNC: 0.49 UIU/ML (ref 0.45–4.5)
UIBC SERPL-MCNC: 389 UG/DL (ref 131–425)
UREA BREATH TEST QL: NEGATIVE
VIT B12 SERPL-MCNC: 642 PG/ML (ref 232–1245)
WBC # BLD AUTO: 8.4 X10E3/UL (ref 3.4–10.8)

## 2024-08-14 ENCOUNTER — TELEPHONE (OUTPATIENT)
Age: 49
End: 2024-08-14

## 2024-08-14 ENCOUNTER — OFFICE VISIT (OUTPATIENT)
Age: 49
End: 2024-08-14

## 2024-08-14 ENCOUNTER — HOSPITAL ENCOUNTER (EMERGENCY)
Facility: HOSPITAL | Age: 49
Discharge: HOME | End: 2024-08-14
Attending: EMERGENCY MEDICINE
Payer: COMMERCIAL

## 2024-08-14 VITALS
TEMPERATURE: 98.7 F | BODY MASS INDEX: 46.93 KG/M2 | WEIGHT: 232.81 LBS | SYSTOLIC BLOOD PRESSURE: 151 MMHG | OXYGEN SATURATION: 100 % | HEART RATE: 97 BPM | RESPIRATION RATE: 18 BRPM | DIASTOLIC BLOOD PRESSURE: 103 MMHG | HEIGHT: 59 IN

## 2024-08-14 DIAGNOSIS — E66.01 MORBID OBESITY (HCC): Primary | ICD-10-CM

## 2024-08-14 DIAGNOSIS — B00.9 HERPES: Primary | ICD-10-CM

## 2024-08-14 DIAGNOSIS — R11.2 NAUSEA AND VOMITING, UNSPECIFIED VOMITING TYPE: Primary | ICD-10-CM

## 2024-08-14 DIAGNOSIS — M62.838 NECK MUSCLE SPASM: ICD-10-CM

## 2024-08-14 DIAGNOSIS — R19.7 DIARRHEA, UNSPECIFIED TYPE: ICD-10-CM

## 2024-08-14 LAB
FLUAV RNA SPEC QL NAA+PROBE: NOT DETECTED
FLUBV RNA SPEC QL NAA+PROBE: NOT DETECTED
SARS-COV-2 RNA RESP QL NAA+PROBE: NOT DETECTED

## 2024-08-14 PROCEDURE — 87636 SARSCOV2 & INF A&B AMP PRB: CPT

## 2024-08-14 PROCEDURE — 99283 EMERGENCY DEPT VISIT LOW MDM: CPT

## 2024-08-14 PROCEDURE — 6370000000 HC RX 637 (ALT 250 FOR IP): Performed by: STUDENT IN AN ORGANIZED HEALTH CARE EDUCATION/TRAINING PROGRAM

## 2024-08-14 RX ORDER — ACYCLOVIR 400 MG/1
400 TABLET ORAL 2 TIMES DAILY
Qty: 20 TABLET | Refills: 0 | Status: SHIPPED | OUTPATIENT
Start: 2024-08-14

## 2024-08-14 RX ORDER — ONDANSETRON 4 MG/1
4 TABLET, ORALLY DISINTEGRATING ORAL 3 TIMES DAILY PRN
Qty: 30 TABLET | Refills: 0 | Status: SHIPPED | OUTPATIENT
Start: 2024-08-14

## 2024-08-14 RX ORDER — ACYCLOVIR 400 MG/1
400 TABLET ORAL 2 TIMES DAILY
COMMUNITY
End: 2024-08-14 | Stop reason: SDUPTHER

## 2024-08-14 RX ORDER — ACETAMINOPHEN 500 MG
1000 TABLET ORAL
Status: COMPLETED | OUTPATIENT
Start: 2024-08-14 | End: 2024-08-14

## 2024-08-14 RX ORDER — BENZONATATE 200 MG/1
200 CAPSULE ORAL 3 TIMES DAILY PRN
Qty: 30 CAPSULE | Refills: 0 | Status: SHIPPED | OUTPATIENT
Start: 2024-08-14 | End: 2024-08-21

## 2024-08-14 RX ORDER — CYCLOBENZAPRINE HCL 10 MG
10 TABLET ORAL DAILY PRN
Qty: 10 TABLET | Refills: 0 | Status: SHIPPED | OUTPATIENT
Start: 2024-08-14

## 2024-08-14 RX ORDER — ONDANSETRON 4 MG/1
4 TABLET, ORALLY DISINTEGRATING ORAL EVERY 8 HOURS PRN
Status: DISCONTINUED | OUTPATIENT
Start: 2024-08-14 | End: 2024-08-14 | Stop reason: HOSPADM

## 2024-08-14 RX ADMIN — ACETAMINOPHEN 1000 MG: 500 TABLET ORAL at 21:22

## 2024-08-14 RX ADMIN — ONDANSETRON 4 MG: 4 TABLET, ORALLY DISINTEGRATING ORAL at 21:11

## 2024-08-14 ASSESSMENT — PAIN SCALES - GENERAL: PAINLEVEL_OUTOF10: 7

## 2024-08-14 ASSESSMENT — PAIN - FUNCTIONAL ASSESSMENT: PAIN_FUNCTIONAL_ASSESSMENT: 0-10

## 2024-08-14 ASSESSMENT — PAIN DESCRIPTION - LOCATION: LOCATION: GENERALIZED

## 2024-08-14 NOTE — TELEPHONE ENCOUNTER
Pt stated that you agreed to send in her prescriptions during her last visit; however, she never received any. She is asking that you send prescriptions for   cyclobenzaprine (FLEXERIL) 10 MG tablet  and  acyclovir (ZOVIRAX) 400 MG tablet to be sent to [facility]/pharmacy #[id number] [place], [place] - [address] [facility] [phone] -  [phone]    Pt is scheduled for an appt on 8/22.    Thank you

## 2024-08-14 NOTE — PROGRESS NOTES
Pre-operative Bariatric Nutrition Evaluation    Date: 2024              Session 1 of 4 (restarting process with new insurance)    Insurance: [employer]            Physician/Surgeon: Dr. Mikey Madden      Name: Anna Anderson  :  1975  Age:  48  Gender: Female  Type of Surgery: [x]   Gastric Bypass (revision from bypass)   []    Sleeve Gastrectomy    ASSESSMENT:    Past Medical History: Gastric bypass , asthma     Medications/Supplements:   Prior to Admission medications    Medication Sig Start Date End Date Taking? Authorizing Provider   acetaminophen (TYLENOL) 500 MG tablet Take 2 tablets by mouth every 6-8 hours as needed for Pain 3/7/24 4/6/24  Brandee Conklin PA-C   naproxen (NAPROSYN) 500 MG tablet Take 1 tablet by mouth 2 times daily as needed for Pain 3/7/24 3/17/24  Brandee Conklin PA-C   cyclobenzaprine (FLEXERIL) 10 MG tablet TAKE 1 TABLET BY MOUTH EVERY DAY AT BEDTIME AS NEEDED FOR 90 DAYS 23   Peg Yates MD   Cyanocobalamin (VITAMIN B-12) 500 MCG LOZG Take by mouth    Peg Yates MD   Multiple Vitamins-Minerals (BARIATRIC MULTIVITAMINS/IRON PO) Take by mouth    Peg Yates MD   Calcium Citrate-Vitamin D (CALCIUM CITRATE + D PO) Take by mouth  Patient not taking: Reported on 2024    Peg Yates MD   vitamin D (ERGOCALCIFEROL) 1.25 MG (58587 UT) CAPS capsule Take 1 capsule by mouth Twice a Week  Patient not taking: Reported on 2024   Heather Wheeler, APRN - NP     Smoking: None  Alcohol: None    Food Allergies/Intolerances:None    Anthropometrics:    Ht:59 inches                Wt: 230 lbs ()                       IBW: 95 lbs                  %IBW: 242                    BMI:46                         Category: Obesity class III    Reported wt history: Pt presents today for pre-op nutrition evaluation for wt loss surgery. Reports lowest adult BW of 170 lbs (after bypass) and highest adult BW of 385 lbs. Attributes

## 2024-08-14 NOTE — TELEPHONE ENCOUNTER
Medication Refill Request    Anna SUN Monica is requesting a refill of the following medication(s):   Requested Prescriptions     Pending Prescriptions Disp Refills    cyclobenzaprine (FLEXERIL) 10 MG tablet      acyclovir (ZOVIRAX) 400 MG tablet       Sig: Take 1 tablet by mouth 2 times daily Take 2 tablets by mouth in the morning and at bedtime for 8 days Take 800 mg (two pills) twice per day for 5 days with herpes flair up        Listed PCP is Demar Zaidi DO   Last provider to prescribe medication: Demar Zaidi DO   Last Date of Medication Prescribed: 11/12/23, 4/13/24   Date of Last Office Visit at [facility]: 04/26/24   FUTURE APPOINTMENT:   Future Appointments   Date Time Provider Department Center   8/22/2024  1:00 PM Emmy Medina MD [facility]   9/11/2024  2:30 PM Salud Ortiz RD [facility]       Please send refill to:    [facility]/pharmacy #[id number] [place], [place] - [address] [facility] -  [phone] - F [phone]  [address] [facility] [address]  Phone: [phone] Fax: [phone]    Please review request and approve or deny with recommendations.

## 2024-08-14 NOTE — TELEPHONE ENCOUNTER
Reviewed chart. Sent in short course of acyclovir and flexeril. To be re-evaluated in clinic on 8/22

## 2024-08-14 NOTE — TELEPHONE ENCOUNTER
She needs contact information regarding an in house sleep apnea test per Dr Madden's instructions

## 2024-08-15 NOTE — DISCHARGE INSTRUCTIONS
Take Zofran as needed for nausea.  Take Imodium to help with diarrhea.  Alternate Tylenol and ibuprofen as needed for body aches and fever.  Please follow with your primary care physician.

## 2024-08-15 NOTE — ED TRIAGE NOTES
Pt ambulated to triage   CC general weakness with vomiting/diarrhea. Pt began vomiting/diarrhea last night.   Took imodine, vomited the medication.   Bodyaches, chills denies fever   Pt coughing in triage.   Reports coworker was dx with covid.

## 2024-08-16 RX ORDER — ERGOCALCIFEROL 1.25 MG/1
50000 CAPSULE ORAL WEEKLY
Qty: 4 CAPSULE | Refills: 2 | Status: SHIPPED | OUTPATIENT
Start: 2024-08-16

## 2024-08-27 ENCOUNTER — OFFICE VISIT (OUTPATIENT)
Age: 49
End: 2024-08-27
Payer: COMMERCIAL

## 2024-08-27 ENCOUNTER — TELEPHONE (OUTPATIENT)
Age: 49
End: 2024-08-27

## 2024-08-27 VITALS
TEMPERATURE: 98.1 F | HEART RATE: 102 BPM | OXYGEN SATURATION: 96 % | BODY MASS INDEX: 47.58 KG/M2 | DIASTOLIC BLOOD PRESSURE: 77 MMHG | SYSTOLIC BLOOD PRESSURE: 138 MMHG | HEIGHT: 59 IN | RESPIRATION RATE: 17 BRPM | WEIGHT: 236 LBS

## 2024-08-27 DIAGNOSIS — R05.1 ACUTE COUGH: ICD-10-CM

## 2024-08-27 DIAGNOSIS — R52 BODY ACHES: ICD-10-CM

## 2024-08-27 DIAGNOSIS — J00 ACUTE NASOPHARYNGITIS: Primary | ICD-10-CM

## 2024-08-27 LAB
GROUP A STREP ANTIGEN, POC: NEGATIVE
INFLUENZA A ANTIGEN, POC: NEGATIVE
INFLUENZA B ANTIGEN, POC: NEGATIVE
VALID INTERNAL CONTROL, POC: YES
VALID INTERNAL CONTROL, POC: YES

## 2024-08-27 PROCEDURE — 99213 OFFICE O/P EST LOW 20 MIN: CPT

## 2024-08-27 PROCEDURE — 87804 INFLUENZA ASSAY W/OPTIC: CPT

## 2024-08-27 PROCEDURE — 87880 STREP A ASSAY W/OPTIC: CPT

## 2024-08-27 RX ORDER — BENZONATATE 100 MG/1
100 CAPSULE ORAL 3 TIMES DAILY PRN
Qty: 30 CAPSULE | Refills: 0 | Status: SHIPPED | OUTPATIENT
Start: 2024-08-27 | End: 2024-09-06

## 2024-08-27 RX ORDER — BENZONATATE 100 MG/1
100 CAPSULE ORAL 3 TIMES DAILY PRN
Qty: 30 CAPSULE | Refills: 0 | Status: SHIPPED | OUTPATIENT
Start: 2024-08-27 | End: 2024-08-27

## 2024-08-27 ASSESSMENT — PATIENT HEALTH QUESTIONNAIRE - PHQ9
SUM OF ALL RESPONSES TO PHQ QUESTIONS 1-9: 0
2. FEELING DOWN, DEPRESSED OR HOPELESS: NOT AT ALL
SUM OF ALL RESPONSES TO PHQ QUESTIONS 1-9: 0
1. LITTLE INTEREST OR PLEASURE IN DOING THINGS: NOT AT ALL
SUM OF ALL RESPONSES TO PHQ9 QUESTIONS 1 & 2: 0

## 2024-08-27 NOTE — TELEPHONE ENCOUNTER
----- Message from Sofia BINDU sent at 8/27/2024  2:03 PM EDT -----  Regarding: ECC Escalation To Practice  ECC Escalation To Practice      Type of Escalation: Acute Care Symptom  --------------------------------------------------------------------------------------------------------------------------    Information for Provider: Demar Zaidi, DO  Patient is looking for appointment for: Symptom: Colds/ Cough  Reasons for Message: Unable to reach practice     Additional Information   Patient is experiencing body ache running nose, coughing with mucus started from sore throat last Monday. She wants to be seen as soon as possible    --------------------------------------------------------------------------------------------------------------------------    Relationship to Patient: Self     Call Back Info: OK to leave message on [employer]  Preferred Call Back Number: Phone [phone] (home)

## 2024-08-27 NOTE — TELEPHONE ENCOUNTER
Spoke with patient who identified self with two patient identifiers(name and ).    On Release of Information Form? N/A, self    Patient Active Problem List   Diagnosis    Status post gastric bypass for obesity    Morbid obesity (HCC)    Snoring       Past Surgical History:   Procedure Laterality Date    BREAST ENHANCEMENT SURGERY       SECTION      GASTRIC BYPASS SURGERY N/A 2004    By Dr. Sukumar Pendleton    LAMELLAR KERATOPLASTY         Allergies:  Allergies   Allergen Reactions    Aspirin Nausea Only        Onset: 23    Chief Complaint/Subjective: Cough; Sore Throat; Body Aches    Current Symptoms:   Cough  Productive  Thick mucus  Sore Throat  Body Aches      Associated Symptoms:   SOB: No  Chest pain:  No  Racing heart rate or palpitations: No  Headache:  No   Visual changes:  No  Lightheaded: No  Slurred speech:  No  Nausea: No  Vomiting: No    Temperature: Denies     Maximum Pain Severity:  8.5    Location: Generalized Body Aches  Pain Quality: aching    Better/Worse:   NA    What has been tried:   \"Perle Drops\" ; TESSALON  Ibuprofen 800     Recommended disposition: Schedule appointment for evaluation    Future Appointments   Date Time Provider Department Center   2024  6:25 PM SCHEDULE, [facility] PM CLINIC TWO [facility] DEP   2024  2:30 PM Salud Ortiz RD [facility]   2024  9:20 AM Demar Zaidi DO [facility] DEP   10/25/2024  8:30 PM BEDROOM [address] [place] [address] Sleep Lab        Recommendations:   Notify office of any changes or concerns prior to appointment  If symptoms worsen, go to urgent care or nearest emergency room for evaluation    Reason for Disposition:   Patient wanted to be seen to rule out pneumonia and strep throat    Patient agreed and verbalized understanding to advice provided.    Horacio Thompson RN

## 2024-08-27 NOTE — PROGRESS NOTES
Roomed by name and .    Chief Complaint   Patient presents with    Cough     Times 3 days    Generalized Body Aches    Chest Congestion    Pharyngitis        Vitals:    24 1821   BP: 138/77   Pulse: (!) 102   Resp: 17   Temp: 98.1 °F (36.7 °C)   TempSrc: Temporal   SpO2: 96%   Weight: 107 kg (236 lb)   Height: 1.499 m (4' 11\")          \"Have you been to the ER, urgent care clinic since your last visit?  Hospitalized since your last visit?\"    Yes [facility].    “Have you seen or consulted any other health care providers outside of [facility] since your last visit?”    NO                                              Click Here for Release of Records Request

## 2024-08-27 NOTE — PROGRESS NOTES
Calcium Citrate-Vitamin D (CALCIUM CITRATE + D PO) Take by mouth (Patient not taking: Reported on 4/26/2024)     No current facility-administered medications for this visit.        Allergies:  Allergies   Allergen Reactions    Aspirin Nausea Only        Health Maintenance:  Health Maintenance Due   Topic Date Due    HIV screen  Never done    Hepatitis C screen  Never done    Hepatitis B vaccine (1 of 3 - 19+ 3-dose series) Never done    DTaP/Tdap/Td vaccine (1 - Tdap) Never done    Cervical cancer screen  Never done    Breast cancer screen  Never done    Colorectal Cancer Screen  Never done    COVID-19 Vaccine (1 - 2023-24 season) Never done    Flu vaccine (1) Never done        Objective:   Vitals reviewed.  Temp 98.1 °F (36.7 °C) (Temporal)   Resp 17   Ht 1.499 m (4' 11\")   Wt 107 kg (236 lb)   LMP 07/27/2024 (Approximate)   BMI 47.67 kg/m²      Physical Exam:  General: No acute distress. Alert. Cooperative. Obese.   Head: Normocephalic. Atraumatic. Tonsils non-erythematous without exudate.   Mouth: Mucosa pink. Moist mucous membranes. BL TM WNL, no bulging.   Respiratory: No increased work of breathing. Symmetric chest rise b/l. CTAB with referred upper airway congestion. No focal consolidations.   Cardiovascular: RRR. No clubbing or cyanosis  GI: Nondistended. No masses.   Extremities: no LE edema. Moving all extremities spontaneously.  Musculoskeletal: Full ROM in all extremities, no obvious deformities.  Skin: Warm, dry. No rashes.   Neuro: Alert, normal behavior. No focal deficit.       Assessment/Plan:     Ms. Anderson was seen for acute visit.       Acute pharyngitis:  Likely viral pharygitis given hx and exam. No OM noted. On exam lungs CTAB, no focal consolidations, no indication for CXR. POC rapid flu negative. POC strep negative. No indication for ABX.   -Discussed conservative treatment with ibuprofen, tylenol, fluids, mucinex, hot tea.   -provided script for tessalon pearls  -FU PRN.   -Provided  with work note     Follow up:   Return if symptoms worsen or fail to improve.    Pt was discussed with Dr Logan (attending physician).      Macy Correia MD  Resident [facility]  08/27/24

## 2024-09-05 ENCOUNTER — TELEPHONE (OUTPATIENT)
Age: 49
End: 2024-09-05

## 2024-09-11 ENCOUNTER — TELEPHONE (OUTPATIENT)
Age: 49
End: 2024-09-11

## 2024-09-13 RX ORDER — ERGOCALCIFEROL 1.25 MG/1
50000 CAPSULE, LIQUID FILLED ORAL WEEKLY
Qty: 12 CAPSULE | Refills: 1 | OUTPATIENT
Start: 2024-09-13

## 2024-09-26 ENCOUNTER — OFFICE VISIT (OUTPATIENT)
Age: 49
End: 2024-09-26

## 2024-09-26 DIAGNOSIS — E66.01 MORBID OBESITY: Primary | ICD-10-CM

## 2024-10-11 ENCOUNTER — OFFICE VISIT (OUTPATIENT)
Age: 49
End: 2024-10-11
Payer: COMMERCIAL

## 2024-10-11 VITALS
OXYGEN SATURATION: 96 % | DIASTOLIC BLOOD PRESSURE: 93 MMHG | RESPIRATION RATE: 18 BRPM | HEART RATE: 91 BPM | WEIGHT: 242.6 LBS | TEMPERATURE: 98.1 F | HEIGHT: 59 IN | SYSTOLIC BLOOD PRESSURE: 144 MMHG | BODY MASS INDEX: 48.91 KG/M2

## 2024-10-11 DIAGNOSIS — Z98.84 STATUS POST GASTRIC BYPASS FOR OBESITY: ICD-10-CM

## 2024-10-11 DIAGNOSIS — Z12.4 CERVICAL CANCER SCREENING: ICD-10-CM

## 2024-10-11 DIAGNOSIS — R63.4 WEIGHT LOSS: Primary | ICD-10-CM

## 2024-10-11 DIAGNOSIS — M62.838 NECK MUSCLE SPASM: ICD-10-CM

## 2024-10-11 DIAGNOSIS — B00.9 HERPES: ICD-10-CM

## 2024-10-11 DIAGNOSIS — E66.01 MORBID OBESITY: ICD-10-CM

## 2024-10-11 PROCEDURE — 99213 OFFICE O/P EST LOW 20 MIN: CPT

## 2024-10-11 RX ORDER — ACYCLOVIR 400 MG/1
400 TABLET ORAL 2 TIMES DAILY
Qty: 20 TABLET | Refills: 0 | Status: SHIPPED | OUTPATIENT
Start: 2024-10-11

## 2024-10-11 RX ORDER — DIAPER,BRIEF,INFANT-TODD,DISP
1 EACH MISCELLANEOUS DAILY
Qty: 90 TABLET | Refills: 3 | Status: SHIPPED | OUTPATIENT
Start: 2024-10-11

## 2024-10-11 RX ORDER — MULTIVIT-MIN/IRON/FOLIC ACID/K 45-800-120
1 CAPSULE ORAL DAILY
Qty: 90 CAPSULE | Refills: 3 | Status: SHIPPED | OUTPATIENT
Start: 2024-10-11

## 2024-10-11 RX ORDER — PHENTERMINE HYDROCHLORIDE 37.5 MG/1
37.5 TABLET ORAL
Qty: 30 TABLET | Refills: 3 | Status: SHIPPED | OUTPATIENT
Start: 2024-10-11 | End: 2025-02-08

## 2024-10-11 RX ORDER — CYANOCOBALAMIN (VITAMIN B-12) 500 MCG
1 LOZENGE ORAL DAILY
Qty: 90 LOZENGE | Refills: 3 | Status: SHIPPED | OUTPATIENT
Start: 2024-10-11

## 2024-10-11 RX ORDER — CYCLOBENZAPRINE HCL 10 MG
10 TABLET ORAL DAILY PRN
Qty: 10 TABLET | Refills: 0 | Status: SHIPPED | OUTPATIENT
Start: 2024-10-11

## 2024-10-11 NOTE — PROGRESS NOTES
Identified pt with two pt identifiers(name and ). Reviewed record in preparation for visit and have obtained necessary documentation.  Chief Complaint   Patient presents with    Gynecologic Exam        Health Maintenance Due   Topic    HIV screen     Hepatitis C screen     Hepatitis B vaccine (1 of 3 - 19+ 3-dose series)    DTaP/Tdap/Td vaccine (1 - Tdap)    Cervical cancer screen     Breast cancer screen     Colorectal Cancer Screen     Flu vaccine (1)    COVID-19 Vaccine ( -  season)    Shingles vaccine (1 of 2)       Vitals:    10/11/24 1600 10/11/24 1616   BP: (!) 150/87 (!) 144/93   Site: Right Upper Arm Left Upper Arm   Position: Sitting Sitting   Cuff Size: Large Adult Large Adult   Pulse: 88 91   Resp: 18    Temp: 98.1 °F (36.7 °C)    TempSrc: Oral    SpO2: 96%    Weight: 110 kg (242 lb 9.6 oz)    Height: 1.499 m (4' 11\")          \"Have you been to the ER, urgent care clinic since your last visit?  Hospitalized since your last visit?\"    NO    “Have you seen or consulted any other health care providers outside of [facility] since your last visit?”    Yes, [facility]    Have you had a mammogram?”   NO    No breast cancer screening on file      “Have you had a pap smear?”    NO    No cervical cancer screening on file         “Have you had a colorectal cancer screening such as a colonoscopy/FIT/Cologuard?    NO    No colonoscopy on file  No cologuard on file  No FIT/FOBT on file   No flexible sigmoidoscopy on file         Click Here for Release of Records Request     This patient is accompanied in the office by her self.  I have received verbal consent from Anna Anderson to discuss any/all medical information while they are present in the room.

## 2024-10-11 NOTE — PROGRESS NOTES
[address] [place] [address]   Office [phone], Fax [phone]    ROUTINE ANNUAL WELL WOMAN    Subjective   Anna Anderson is a 49 y.o. female who presents to clinic today for pap smear and weight loss follow up     Gastric Bypass, been on and off phentermine for the past few years. Does well when on it. Seeking re-initiation today. Also seeing bariatric team and has tentative plans for surgery in near future if unable to lose weight on her own.    Past Medical History  Past Medical History:   Diagnosis Date    Asthma 2014    Hx of cornea transplant     Left Eye    Osteoarthritis 2023     Current Medications   Current Outpatient Medications on File Prior to Visit   Medication Sig Dispense Refill    vitamin D (ERGOCALCIFEROL) 1.25 MG (32035 UT) CAPS capsule Take 1 capsule by mouth once a week 4 capsule 2    ondansetron (ZOFRAN-ODT) 4 MG disintegrating tablet Take 1 tablet by mouth 3 times daily as needed for Nausea or Vomiting (Patient not taking: Reported on 10/11/2024) 30 tablet 0    acetaminophen (TYLENOL) 500 MG tablet Take 2 tablets by mouth every 6-8 hours as needed for Pain (Patient not taking: Reported on 10/11/2024) 60 tablet 0    naproxen (NAPROSYN) 500 MG tablet Take 1 tablet by mouth 2 times daily as needed for Pain (Patient not taking: Reported on 10/11/2024) 20 tablet 0     No current facility-administered medications on file prior to visit.       Surgical History  Past Surgical History:   Procedure Laterality Date    BREAST ENHANCEMENT SURGERY       SECTION      GASTRIC BYPASS SURGERY N/A     By Dr. Sukumar Pendleton    LAMELLAR KERATOPLASTY         Family History   Family History   Problem Relation Age of Onset    Breast Cancer Mother     Cancer Mother        Social History  Social History     Socioeconomic History    Marital status: Single     Spouse name: Not on file    Number of children: Not on file    Years of education: Not on file    Highest

## 2024-10-16 LAB
CYTOLOGIST CVX/VAG CYTO: NORMAL
CYTOLOGY CVX/VAG DOC CYTO: NORMAL
CYTOLOGY CVX/VAG DOC THIN PREP: NORMAL
DX ICD CODE: NORMAL
HPV GENOTYPE REFLEX: NORMAL
HPV I/H RISK 4 DNA CVX QL PROBE+SIG AMP: NEGATIVE
Lab: NORMAL
OTHER STN SPEC: NORMAL
STAT OF ADQ CVX/VAG CYTO-IMP: NORMAL

## 2024-10-24 ENCOUNTER — OFFICE VISIT (OUTPATIENT)
Age: 49
End: 2024-10-24

## 2024-10-24 DIAGNOSIS — E66.01 MORBID OBESITY: Primary | ICD-10-CM

## 2024-10-24 NOTE — PROGRESS NOTES
[facility]  Supervised Weight Loss     Date:   10/24/2024    Patient's Name: Anna Anderson  : 1975    Insurance:  [facility]          Session: 3 of  4  Surgery:  Gastric Bypass (revision from bypass)      Surgeon:  Dr. Mikey Madden     Height: 59 inches       Weight:    237     Lbs (pt stated)                               BMI:  48             Pounds Lost since last month: 0               Pounds Gained since last month: 7 lbs     Starting Weight: 230 lbs                   Previous Month’s Weight: 230 lbs  Overall Pounds Lost: 0        Overall Pounds Gained: 0     Other Pertinent Information: Today's appointment was completed in a virtual setting.      Smoking Status:  None  Alcohol Intake: None     I have reviewed with pt the guidelines of the supervised wt loss program.  Pt understands the expectations of some wt loss during the program and that wt gain could delay the process. I have also explained that appointments need to be consecutive and missing an appointment may result in starting over. Pt has received this information in writing.          Changes that patient has made since last month include: tried Fairlife shakes, taking bariatric vitamins, started on phentermine.      Eating Habits and Behaviors  General healthy eating guidelines were also discussed. Pts were instructed that their plate should be made up 1/2 plate coming from non-starchy vegetables, 1/4 coming from lean meat, and 1/4 of their plate coming from carbohydrates, including fruits, starches, or milk. We discussed measuring meals to 1/2 cup total per meal after surgery. Drinking only calorie-free, sugar-free and non-carbonated beverages. We discussed the importance of drinking 64 ounces of fluid per day to prevent dehydration post-operatively.                       Patient's current diet habits include: Pt is eating 3 meals per day (B: Fairlife shake or pepperoni/cheese cups or Greek yogurt;

## 2024-11-06 ENCOUNTER — TELEPHONE (OUTPATIENT)
Age: 49
End: 2024-11-06

## 2024-11-06 NOTE — TELEPHONE ENCOUNTER
I spoke with the patient. She informed me that her sleep study was previously cancelled in October because of [employer]. The noted the patient had previously had a study done. The patient informed me that not enough data had been obtained from the home sleep study, so she would need to come in to the facility. The information had not been sent to [facility]. She is now scheduled for the in house sleep study on 11/25/24. She informed me that the information related to the previous study will be sent to [facility]. Pending [facility] obtaining this information the study will be done on 11/25/24. I told her I will route this to Dr. Madden and others who need to be aware of this matter. She acknowledged understanding and thanked me for the call.

## 2024-11-06 NOTE — TELEPHONE ENCOUNTER
Identified patient with two patient identifiers (name and ). Reviewed chart in preparation for encounter and have obtained necessary documentation.     Patient called and has question regarding her ins and sleep study and would like a call back from joselin's nurse.

## 2024-11-12 NOTE — TELEPHONE ENCOUNTER
I contacted [facility] and asked to have labs done on 8/6/24 to be sent to the office.

## 2024-11-13 ENCOUNTER — TELEPHONE (OUTPATIENT)
Age: 49
End: 2024-11-13

## 2024-11-13 NOTE — TELEPHONE ENCOUNTER
Patient called to touch base about the status of upcoming sleep study. Informed her the authorization is still pending and to give a few more days. She understands and might check back again next week. Also informed she will get a call either way. Patient reiterated to please give 1-2 days notice.

## 2024-11-14 DIAGNOSIS — M62.838 NECK MUSCLE SPASM: ICD-10-CM

## 2024-11-14 RX ORDER — ERGOCALCIFEROL 1.25 MG/1
50000 CAPSULE, LIQUID FILLED ORAL WEEKLY
Qty: 4 CAPSULE | Refills: 2 | Status: SHIPPED | OUTPATIENT
Start: 2024-11-14

## 2024-11-18 RX ORDER — CYCLOBENZAPRINE HCL 10 MG
10 TABLET ORAL DAILY PRN
Qty: 10 TABLET | Refills: 0 | Status: SHIPPED | OUTPATIENT
Start: 2024-11-18

## 2024-11-19 ENCOUNTER — OFFICE VISIT (OUTPATIENT)
Age: 49
End: 2024-11-19

## 2024-11-19 DIAGNOSIS — E66.01 MORBID OBESITY: Primary | ICD-10-CM

## 2024-11-19 NOTE — PROGRESS NOTES
[facility]  Supervised Weight Loss     Date:   2024    Patient's Name: Anna Anderson  : 1975    Anna Anderson was evaluated through a synchronous (real-time) audio encounter. Patient identification was verified at the start of the visit.    The patient was located at [place], [place]  The provider was located at [place], [place]  Confirm you are appropriately licensed, registered, or certified to deliver care in the state where the patient is located as indicated above. If you are not or unsure, please re-schedule the visit: Yes, I confirm.       Insurance:  [employer]          Session: 4 of  4  Surgery:  Gastric Bypass (revision from bypass)      Surgeon:  Dr. Mikey Madden     Height: 59 inches       Weight:    230    Lbs (pt stated)                               BMI:  46             Pounds Lost since last month: 7 lbs              Pounds Gained since last month: 0     Starting Weight: 230 lbs                   Previous Month’s Weight: 237 lbs  Overall Pounds Lost: 0        Overall Pounds Gained: 0     Other Pertinent Information: Today's appointment was completed in a virtual setting.      Smoking Status:  None  Alcohol Intake: None     I have reviewed with pt the guidelines of the supervised wt loss program.  Pt understands the expectations of some wt loss during the program and that wt gain could delay the process. I have also explained that appointments need to be consecutive and missing an appointment may result in starting over. Pt has received this information in writing.          Changes that patient has made since last month include:  consistent meals and exercise.      Eating Habits and Behaviors  A nutrition lesson was presented on label reading with specific guidelines provided for limiting added sugars. This information will help increase healthy food choices, promote weight loss and prevent dumping syndrome after gastric bypass. We also

## 2024-11-25 ENCOUNTER — HOSPITAL ENCOUNTER (OUTPATIENT)
Facility: HOSPITAL | Age: 49
Discharge: HOME | End: 2024-11-28
Attending: SPECIALIST
Payer: COMMERCIAL

## 2024-11-25 DIAGNOSIS — G47.33 OSA (OBSTRUCTIVE SLEEP APNEA): ICD-10-CM

## 2024-11-25 PROCEDURE — 95810 POLYSOM 6/> YRS 4/> PARAM: CPT | Performed by: SPECIALIST

## 2024-11-26 VITALS
TEMPERATURE: 98.3 F | DIASTOLIC BLOOD PRESSURE: 84 MMHG | WEIGHT: 242 LBS | HEART RATE: 99 BPM | HEIGHT: 59 IN | BODY MASS INDEX: 48.79 KG/M2 | OXYGEN SATURATION: 97 % | SYSTOLIC BLOOD PRESSURE: 140 MMHG

## 2024-12-01 ENCOUNTER — CLINICAL DOCUMENTATION (OUTPATIENT)
Age: 49
End: 2024-12-01

## 2024-12-01 DIAGNOSIS — G47.33 OSA (OBSTRUCTIVE SLEEP APNEA): Primary | ICD-10-CM
